# Patient Record
Sex: MALE | Race: WHITE | NOT HISPANIC OR LATINO
[De-identification: names, ages, dates, MRNs, and addresses within clinical notes are randomized per-mention and may not be internally consistent; named-entity substitution may affect disease eponyms.]

---

## 2018-07-11 ENCOUNTER — TRANSCRIPTION ENCOUNTER (OUTPATIENT)
Age: 63
End: 2018-07-11

## 2020-12-14 ENCOUNTER — EMERGENCY (EMERGENCY)
Facility: HOSPITAL | Age: 65
LOS: 1 days | Discharge: HOME | End: 2020-12-14
Attending: STUDENT IN AN ORGANIZED HEALTH CARE EDUCATION/TRAINING PROGRAM | Admitting: STUDENT IN AN ORGANIZED HEALTH CARE EDUCATION/TRAINING PROGRAM
Payer: COMMERCIAL

## 2020-12-14 VITALS
OXYGEN SATURATION: 94 % | SYSTOLIC BLOOD PRESSURE: 111 MMHG | DIASTOLIC BLOOD PRESSURE: 75 MMHG | HEART RATE: 123 BPM | WEIGHT: 169.98 LBS | HEIGHT: 73 IN | RESPIRATION RATE: 24 BRPM | TEMPERATURE: 99 F

## 2020-12-14 VITALS
DIASTOLIC BLOOD PRESSURE: 60 MMHG | SYSTOLIC BLOOD PRESSURE: 128 MMHG | HEART RATE: 88 BPM | RESPIRATION RATE: 16 BRPM | OXYGEN SATURATION: 98 % | TEMPERATURE: 97 F

## 2020-12-14 LAB
ALBUMIN SERPL ELPH-MCNC: 3.3 G/DL — SIGNIFICANT CHANGE UP (ref 3.3–5)
ALP SERPL-CCNC: 67 U/L — SIGNIFICANT CHANGE UP (ref 40–120)
ALT FLD-CCNC: 28 U/L — SIGNIFICANT CHANGE UP (ref 12–78)
ANION GAP SERPL CALC-SCNC: 9 MMOL/L — SIGNIFICANT CHANGE UP (ref 5–17)
AST SERPL-CCNC: 14 U/L — LOW (ref 15–37)
BASOPHILS # BLD AUTO: 0.02 K/UL — SIGNIFICANT CHANGE UP (ref 0–0.2)
BASOPHILS NFR BLD AUTO: 0.2 % — SIGNIFICANT CHANGE UP (ref 0–2)
BILIRUB SERPL-MCNC: 2 MG/DL — HIGH (ref 0.2–1.2)
BUN SERPL-MCNC: 12 MG/DL — SIGNIFICANT CHANGE UP (ref 7–23)
CALCIUM SERPL-MCNC: 8.4 MG/DL — LOW (ref 8.5–10.1)
CHLORIDE SERPL-SCNC: 103 MMOL/L — SIGNIFICANT CHANGE UP (ref 96–108)
CO2 SERPL-SCNC: 26 MMOL/L — SIGNIFICANT CHANGE UP (ref 22–31)
CREAT SERPL-MCNC: 0.91 MG/DL — SIGNIFICANT CHANGE UP (ref 0.5–1.3)
D DIMER BLD IA.RAPID-MCNC: 196 NG/ML DDU — SIGNIFICANT CHANGE UP
EOSINOPHIL # BLD AUTO: 0 K/UL — SIGNIFICANT CHANGE UP (ref 0–0.5)
EOSINOPHIL NFR BLD AUTO: 0 % — SIGNIFICANT CHANGE UP (ref 0–6)
FERRITIN SERPL-MCNC: 191 NG/ML — SIGNIFICANT CHANGE UP (ref 30–400)
GLUCOSE SERPL-MCNC: 109 MG/DL — HIGH (ref 70–99)
HCT VFR BLD CALC: 41.7 % — SIGNIFICANT CHANGE UP (ref 39–50)
HGB BLD-MCNC: 14.5 G/DL — SIGNIFICANT CHANGE UP (ref 13–17)
IMM GRANULOCYTES NFR BLD AUTO: 0.5 % — SIGNIFICANT CHANGE UP (ref 0–1.5)
LACTATE SERPL-SCNC: 1.6 MMOL/L — SIGNIFICANT CHANGE UP (ref 0.7–2)
LYMPHOCYTES # BLD AUTO: 0.68 K/UL — LOW (ref 1–3.3)
LYMPHOCYTES # BLD AUTO: 6.4 % — LOW (ref 13–44)
MCHC RBC-ENTMCNC: 29.2 PG — SIGNIFICANT CHANGE UP (ref 27–34)
MCHC RBC-ENTMCNC: 34.8 GM/DL — SIGNIFICANT CHANGE UP (ref 32–36)
MCV RBC AUTO: 84.1 FL — SIGNIFICANT CHANGE UP (ref 80–100)
MONOCYTES # BLD AUTO: 0.98 K/UL — HIGH (ref 0–0.9)
MONOCYTES NFR BLD AUTO: 9.3 % — SIGNIFICANT CHANGE UP (ref 2–14)
NEUTROPHILS # BLD AUTO: 8.82 K/UL — HIGH (ref 1.8–7.4)
NEUTROPHILS NFR BLD AUTO: 83.6 % — HIGH (ref 43–77)
NRBC # BLD: 0 /100 WBCS — SIGNIFICANT CHANGE UP (ref 0–0)
NT-PROBNP SERPL-SCNC: 4512 PG/ML — HIGH (ref 0–125)
PLATELET # BLD AUTO: 178 K/UL — SIGNIFICANT CHANGE UP (ref 150–400)
POTASSIUM SERPL-MCNC: 3.4 MMOL/L — LOW (ref 3.5–5.3)
POTASSIUM SERPL-SCNC: 3.4 MMOL/L — LOW (ref 3.5–5.3)
PROCALCITONIN SERPL-MCNC: <0.05 — SIGNIFICANT CHANGE UP (ref 0–0.04)
PROT SERPL-MCNC: 6.6 G/DL — SIGNIFICANT CHANGE UP (ref 6–8.3)
RAPID RVP RESULT: SIGNIFICANT CHANGE UP
RBC # BLD: 4.96 M/UL — SIGNIFICANT CHANGE UP (ref 4.2–5.8)
RBC # FLD: 13.1 % — SIGNIFICANT CHANGE UP (ref 10.3–14.5)
SARS-COV-2 RNA SPEC QL NAA+PROBE: SIGNIFICANT CHANGE UP
SODIUM SERPL-SCNC: 138 MMOL/L — SIGNIFICANT CHANGE UP (ref 135–145)
TROPONIN I SERPL-MCNC: <.015 NG/ML — SIGNIFICANT CHANGE UP (ref 0.01–0.04)
WBC # BLD: 10.55 K/UL — HIGH (ref 3.8–10.5)
WBC # FLD AUTO: 10.55 K/UL — HIGH (ref 3.8–10.5)

## 2020-12-14 PROCEDURE — 93005 ELECTROCARDIOGRAM TRACING: CPT

## 2020-12-14 PROCEDURE — 96365 THER/PROPH/DIAG IV INF INIT: CPT

## 2020-12-14 PROCEDURE — 80053 COMPREHEN METABOLIC PANEL: CPT

## 2020-12-14 PROCEDURE — 71045 X-RAY EXAM CHEST 1 VIEW: CPT

## 2020-12-14 PROCEDURE — 83605 ASSAY OF LACTIC ACID: CPT

## 2020-12-14 PROCEDURE — 87040 BLOOD CULTURE FOR BACTERIA: CPT

## 2020-12-14 PROCEDURE — 93010 ELECTROCARDIOGRAM REPORT: CPT

## 2020-12-14 PROCEDURE — 82728 ASSAY OF FERRITIN: CPT

## 2020-12-14 PROCEDURE — 85379 FIBRIN DEGRADATION QUANT: CPT

## 2020-12-14 PROCEDURE — 99285 EMERGENCY DEPT VISIT HI MDM: CPT

## 2020-12-14 PROCEDURE — 83880 ASSAY OF NATRIURETIC PEPTIDE: CPT

## 2020-12-14 PROCEDURE — 85025 COMPLETE CBC W/AUTO DIFF WBC: CPT

## 2020-12-14 PROCEDURE — 99284 EMERGENCY DEPT VISIT MOD MDM: CPT | Mod: 25

## 2020-12-14 PROCEDURE — 71045 X-RAY EXAM CHEST 1 VIEW: CPT | Mod: 26

## 2020-12-14 PROCEDURE — 84484 ASSAY OF TROPONIN QUANT: CPT

## 2020-12-14 PROCEDURE — 84145 PROCALCITONIN (PCT): CPT

## 2020-12-14 PROCEDURE — 36415 COLL VENOUS BLD VENIPUNCTURE: CPT

## 2020-12-14 PROCEDURE — 0225U NFCT DS DNA&RNA 21 SARSCOV2: CPT

## 2020-12-14 RX ORDER — POTASSIUM CHLORIDE 20 MEQ
40 PACKET (EA) ORAL ONCE
Refills: 0 | Status: COMPLETED | OUTPATIENT
Start: 2020-12-14 | End: 2020-12-14

## 2020-12-14 RX ORDER — CEFTRIAXONE 500 MG/1
1000 INJECTION, POWDER, FOR SOLUTION INTRAMUSCULAR; INTRAVENOUS ONCE
Refills: 0 | Status: COMPLETED | OUTPATIENT
Start: 2020-12-14 | End: 2020-12-14

## 2020-12-14 RX ORDER — ACETAMINOPHEN 500 MG
1000 TABLET ORAL ONCE
Refills: 0 | Status: COMPLETED | OUTPATIENT
Start: 2020-12-14 | End: 2020-12-14

## 2020-12-14 RX ORDER — SODIUM CHLORIDE 9 MG/ML
1500 INJECTION INTRAMUSCULAR; INTRAVENOUS; SUBCUTANEOUS ONCE
Refills: 0 | Status: COMPLETED | OUTPATIENT
Start: 2020-12-14 | End: 2020-12-14

## 2020-12-14 RX ORDER — AZITHROMYCIN 500 MG/1
500 TABLET, FILM COATED ORAL ONCE
Refills: 0 | Status: COMPLETED | OUTPATIENT
Start: 2020-12-14 | End: 2020-12-14

## 2020-12-14 RX ADMIN — SODIUM CHLORIDE 1500 MILLILITER(S): 9 INJECTION INTRAMUSCULAR; INTRAVENOUS; SUBCUTANEOUS at 09:30

## 2020-12-14 RX ADMIN — CEFTRIAXONE 100 MILLIGRAM(S): 500 INJECTION, POWDER, FOR SOLUTION INTRAMUSCULAR; INTRAVENOUS at 10:50

## 2020-12-14 RX ADMIN — CEFTRIAXONE 1000 MILLIGRAM(S): 500 INJECTION, POWDER, FOR SOLUTION INTRAMUSCULAR; INTRAVENOUS at 11:20

## 2020-12-14 RX ADMIN — Medication 1000 MILLIGRAM(S): at 09:59

## 2020-12-14 RX ADMIN — Medication 40 MILLIEQUIVALENT(S): at 10:51

## 2020-12-14 RX ADMIN — Medication 1000 MILLIGRAM(S): at 10:52

## 2020-12-14 RX ADMIN — AZITHROMYCIN 500 MILLIGRAM(S): 500 TABLET, FILM COATED ORAL at 12:00

## 2020-12-14 NOTE — ED PROVIDER NOTE - PATIENT PORTAL LINK FT
You can access the FollowMyHealth Patient Portal offered by [facility] by registering at the following website: [email or web address]. By joining [facility]’s FollowMyHealth portal, you will also be able to view your health information using other applications (apps) compatible with our system.

## 2020-12-14 NOTE — ED ADULT NURSE NOTE - OBJECTIVE STATEMENT
Received the patient in the Er. Patient is alert and oriented. Skin warm and dry. C/o weakness and not feeling fell.

## 2020-12-14 NOTE — ED PROVIDER NOTE - NS ED ROS FT
Constitutional: No fever.  Neurological: No headache.  Eyes: No vision changes.   Ears, Nose, Mouth, Throat: No congestion.  Cardiovascular: +chest pain.  Respiratory: + difficulty breathing.  Gastrointestinal: +nausea. No vomiting.  Genitourinary: No dysuria.  Musculoskeletal: + musculoskeletal pain.  Integumentary (skin and/or breast): No rash.

## 2020-12-14 NOTE — ED PROVIDER NOTE - PROGRESS NOTE DETAILS
patient feels well, found to have PNA, will discharge with PMD follow up. patient made aware of eelvated BNP, will follow up with cardio.

## 2020-12-14 NOTE — ED PROVIDER NOTE - CARE PROVIDER_API CALL
Moon Torres  CRITICAL CARE MEDICINE  [address] [place] [address], [address]  [place] [address]  Phone: [phone]  Fax: [phone]  Established Patient  Follow Up Time: 1-3 Days

## 2020-12-14 NOTE — ED PROVIDER NOTE - OBJECTIVE STATEMENT
65 M history of Myasthenia gravis, HTN, left foot drop here complaining of several days of body aches, chest pressure, cough, shortness of breath. Was tested for COVID-19 rapid which was negative, he is waiting for the PCR. he takes prednisone for MG but does not feel like he is having an exacerbation.

## 2020-12-14 NOTE — ED PROVIDER NOTE - CLINICAL SUMMARY MEDICAL DECISION MAKING FREE TEXT BOX
shortness of breath, chest pain, cough- r/o PNA, ACS, PE. May represent viral syndrome inclusive of COVID-19

## 2020-12-14 NOTE — ED PROVIDER NOTE - PHYSICAL EXAMINATION
Vital signs as available reviewed.  General:  Comfortable, no acute distress.  Head:  Normocephalic, atraumatic.  Eyes:  Conjunctiva pink, no icterus.  Cardiovascular:  Tachycardia, + systolic murmur.  Respiratory:  Clear to auscultation, good air entry bilaterally.  Abdomen:  Soft, non-tender.  Musculoskeletal:  No deformity or calf tenderness.  Neurologic: Alert and oriented, moving all extremities.  Skin:  Warm and dry.

## 2020-12-18 ENCOUNTER — TRANSCRIPTION ENCOUNTER (OUTPATIENT)
Age: 65
End: 2020-12-18

## 2020-12-18 ENCOUNTER — INPATIENT (INPATIENT)
Facility: HOSPITAL | Age: 65
LOS: 1 days | Discharge: OTHER INSTITUTION | DRG: 177 | End: 2020-12-20
Attending: INTERNAL MEDICINE | Admitting: INTERNAL MEDICINE
Payer: COMMERCIAL

## 2020-12-18 VITALS
OXYGEN SATURATION: 89 % | SYSTOLIC BLOOD PRESSURE: 103 MMHG | TEMPERATURE: 99 F | HEIGHT: 73 IN | DIASTOLIC BLOOD PRESSURE: 77 MMHG | WEIGHT: 169.98 LBS | HEART RATE: 140 BPM | RESPIRATION RATE: 32 BRPM

## 2020-12-18 DIAGNOSIS — J96.01 ACUTE RESPIRATORY FAILURE WITH HYPOXIA: ICD-10-CM

## 2020-12-18 DIAGNOSIS — J18.9 PNEUMONIA, UNSPECIFIED ORGANISM: ICD-10-CM

## 2020-12-18 DIAGNOSIS — G70.00 MYASTHENIA GRAVIS WITHOUT (ACUTE) EXACERBATION: ICD-10-CM

## 2020-12-18 DIAGNOSIS — Z29.9 ENCOUNTER FOR PROPHYLACTIC MEASURES, UNSPECIFIED: ICD-10-CM

## 2020-12-18 DIAGNOSIS — I10 ESSENTIAL (PRIMARY) HYPERTENSION: ICD-10-CM

## 2020-12-18 DIAGNOSIS — E78.5 HYPERLIPIDEMIA, UNSPECIFIED: ICD-10-CM

## 2020-12-18 DIAGNOSIS — Z98.890 OTHER SPECIFIED POSTPROCEDURAL STATES: Chronic | ICD-10-CM

## 2020-12-18 DIAGNOSIS — I48.91 UNSPECIFIED ATRIAL FIBRILLATION: ICD-10-CM

## 2020-12-18 PROBLEM — R01.1 CARDIAC MURMUR, UNSPECIFIED: Chronic | Status: ACTIVE | Noted: 2020-12-14

## 2020-12-18 LAB
ALBUMIN SERPL ELPH-MCNC: 2.8 G/DL — LOW (ref 3.3–5)
ALBUMIN SERPL ELPH-MCNC: 3 G/DL — LOW (ref 3.3–5)
ALP SERPL-CCNC: 50 U/L — SIGNIFICANT CHANGE UP (ref 40–120)
ALP SERPL-CCNC: 54 U/L — SIGNIFICANT CHANGE UP (ref 40–120)
ALT FLD-CCNC: 28 U/L — SIGNIFICANT CHANGE UP (ref 12–78)
ALT FLD-CCNC: 29 U/L — SIGNIFICANT CHANGE UP (ref 12–78)
ANION GAP SERPL CALC-SCNC: 11 MMOL/L — SIGNIFICANT CHANGE UP (ref 5–17)
ANION GAP SERPL CALC-SCNC: 12 MMOL/L — SIGNIFICANT CHANGE UP (ref 5–17)
APTT BLD: 24.7 SEC — LOW (ref 27.5–35.5)
AST SERPL-CCNC: 15 U/L — SIGNIFICANT CHANGE UP (ref 15–37)
AST SERPL-CCNC: 15 U/L — SIGNIFICANT CHANGE UP (ref 15–37)
BASE EXCESS BLDV CALC-SCNC: -0.4 MMOL/L — SIGNIFICANT CHANGE UP (ref -2–2)
BASOPHILS # BLD AUTO: 0.02 K/UL — SIGNIFICANT CHANGE UP (ref 0–0.2)
BASOPHILS # BLD AUTO: 0.03 K/UL — SIGNIFICANT CHANGE UP (ref 0–0.2)
BASOPHILS NFR BLD AUTO: 0.2 % — SIGNIFICANT CHANGE UP (ref 0–2)
BASOPHILS NFR BLD AUTO: 0.3 % — SIGNIFICANT CHANGE UP (ref 0–2)
BILIRUB SERPL-MCNC: 0.9 MG/DL — SIGNIFICANT CHANGE UP (ref 0.2–1.2)
BILIRUB SERPL-MCNC: 1.1 MG/DL — SIGNIFICANT CHANGE UP (ref 0.2–1.2)
BLOOD GAS COMMENTS, VENOUS: SIGNIFICANT CHANGE UP
BUN SERPL-MCNC: 11 MG/DL — SIGNIFICANT CHANGE UP (ref 7–23)
BUN SERPL-MCNC: 12 MG/DL — SIGNIFICANT CHANGE UP (ref 7–23)
CALCIUM SERPL-MCNC: 8.3 MG/DL — LOW (ref 8.5–10.1)
CALCIUM SERPL-MCNC: 8.5 MG/DL — SIGNIFICANT CHANGE UP (ref 8.5–10.1)
CHLORIDE SERPL-SCNC: 100 MMOL/L — SIGNIFICANT CHANGE UP (ref 96–108)
CHLORIDE SERPL-SCNC: 99 MMOL/L — SIGNIFICANT CHANGE UP (ref 96–108)
CO2 SERPL-SCNC: 24 MMOL/L — SIGNIFICANT CHANGE UP (ref 22–31)
CO2 SERPL-SCNC: 26 MMOL/L — SIGNIFICANT CHANGE UP (ref 22–31)
CREAT SERPL-MCNC: 0.8 MG/DL — SIGNIFICANT CHANGE UP (ref 0.5–1.3)
CREAT SERPL-MCNC: 0.81 MG/DL — SIGNIFICANT CHANGE UP (ref 0.5–1.3)
CRP SERPL-MCNC: 10.27 MG/DL — HIGH (ref 0–0.4)
CRP SERPL-MCNC: 9.38 MG/DL — HIGH (ref 0–0.4)
D DIMER BLD IA.RAPID-MCNC: 286 NG/ML DDU — HIGH
D DIMER BLD IA.RAPID-MCNC: 347 NG/ML DDU — HIGH
EOSINOPHIL # BLD AUTO: 0.02 K/UL — SIGNIFICANT CHANGE UP (ref 0–0.5)
EOSINOPHIL # BLD AUTO: 0.16 K/UL — SIGNIFICANT CHANGE UP (ref 0–0.5)
EOSINOPHIL NFR BLD AUTO: 0.2 % — SIGNIFICANT CHANGE UP (ref 0–6)
EOSINOPHIL NFR BLD AUTO: 1.8 % — SIGNIFICANT CHANGE UP (ref 0–6)
FERRITIN SERPL-MCNC: 228 NG/ML — SIGNIFICANT CHANGE UP (ref 30–400)
FERRITIN SERPL-MCNC: 229 NG/ML — SIGNIFICANT CHANGE UP (ref 30–400)
GLUCOSE SERPL-MCNC: 112 MG/DL — HIGH (ref 70–99)
GLUCOSE SERPL-MCNC: 121 MG/DL — HIGH (ref 70–99)
HCO3 BLDV-SCNC: 22 MMOL/L — SIGNIFICANT CHANGE UP (ref 21–29)
HCT VFR BLD CALC: 37.4 % — LOW (ref 39–50)
HCT VFR BLD CALC: 38.2 % — LOW (ref 39–50)
HGB BLD-MCNC: 13.1 G/DL — SIGNIFICANT CHANGE UP (ref 13–17)
HGB BLD-MCNC: 13.3 G/DL — SIGNIFICANT CHANGE UP (ref 13–17)
HOROWITZ INDEX BLDV+IHG-RTO: 21 — SIGNIFICANT CHANGE UP
IMM GRANULOCYTES NFR BLD AUTO: 0.3 % — SIGNIFICANT CHANGE UP (ref 0–1.5)
IMM GRANULOCYTES NFR BLD AUTO: 0.7 % — SIGNIFICANT CHANGE UP (ref 0–1.5)
INR BLD: 1.23 RATIO — HIGH (ref 0.88–1.16)
LACTATE SERPL-SCNC: 1.8 MMOL/L — SIGNIFICANT CHANGE UP (ref 0.7–2)
LYMPHOCYTES # BLD AUTO: 0.64 K/UL — LOW (ref 1–3.3)
LYMPHOCYTES # BLD AUTO: 0.89 K/UL — LOW (ref 1–3.3)
LYMPHOCYTES # BLD AUTO: 6.7 % — LOW (ref 13–44)
LYMPHOCYTES # BLD AUTO: 9.9 % — LOW (ref 13–44)
MCHC RBC-ENTMCNC: 29.4 PG — SIGNIFICANT CHANGE UP (ref 27–34)
MCHC RBC-ENTMCNC: 29.6 PG — SIGNIFICANT CHANGE UP (ref 27–34)
MCHC RBC-ENTMCNC: 34.8 GM/DL — SIGNIFICANT CHANGE UP (ref 32–36)
MCHC RBC-ENTMCNC: 35 GM/DL — SIGNIFICANT CHANGE UP (ref 32–36)
MCV RBC AUTO: 84 FL — SIGNIFICANT CHANGE UP (ref 80–100)
MCV RBC AUTO: 85.1 FL — SIGNIFICANT CHANGE UP (ref 80–100)
MONOCYTES # BLD AUTO: 0.99 K/UL — HIGH (ref 0–0.9)
MONOCYTES # BLD AUTO: 1.04 K/UL — HIGH (ref 0–0.9)
MONOCYTES NFR BLD AUTO: 10.4 % — SIGNIFICANT CHANGE UP (ref 2–14)
MONOCYTES NFR BLD AUTO: 11.6 % — SIGNIFICANT CHANGE UP (ref 2–14)
NEUTROPHILS # BLD AUTO: 6.79 K/UL — SIGNIFICANT CHANGE UP (ref 1.8–7.4)
NEUTROPHILS # BLD AUTO: 7.84 K/UL — HIGH (ref 1.8–7.4)
NEUTROPHILS NFR BLD AUTO: 75.7 % — SIGNIFICANT CHANGE UP (ref 43–77)
NEUTROPHILS NFR BLD AUTO: 82.2 % — HIGH (ref 43–77)
NRBC # BLD: 0 /100 WBCS — SIGNIFICANT CHANGE UP (ref 0–0)
NRBC # BLD: 0 /100 WBCS — SIGNIFICANT CHANGE UP (ref 0–0)
PCO2 BLDV: 50 MMHG — SIGNIFICANT CHANGE UP (ref 35–50)
PH BLDV: 7.32 — LOW (ref 7.35–7.45)
PLATELET # BLD AUTO: 194 K/UL — SIGNIFICANT CHANGE UP (ref 150–400)
PLATELET # BLD AUTO: 266 K/UL — SIGNIFICANT CHANGE UP (ref 150–400)
PO2 BLDV: <44 MMHG — SIGNIFICANT CHANGE UP (ref 25–45)
POTASSIUM SERPL-MCNC: 3.7 MMOL/L — SIGNIFICANT CHANGE UP (ref 3.5–5.3)
POTASSIUM SERPL-MCNC: 3.8 MMOL/L — SIGNIFICANT CHANGE UP (ref 3.5–5.3)
POTASSIUM SERPL-SCNC: 3.7 MMOL/L — SIGNIFICANT CHANGE UP (ref 3.5–5.3)
POTASSIUM SERPL-SCNC: 3.8 MMOL/L — SIGNIFICANT CHANGE UP (ref 3.5–5.3)
PROT SERPL-MCNC: 6.1 G/DL — SIGNIFICANT CHANGE UP (ref 6–8.3)
PROT SERPL-MCNC: 6.5 G/DL — SIGNIFICANT CHANGE UP (ref 6–8.3)
PROTHROM AB SERPL-ACNC: 14.2 SEC — HIGH (ref 10.6–13.6)
RBC # BLD: 4.45 M/UL — SIGNIFICANT CHANGE UP (ref 4.2–5.8)
RBC # BLD: 4.49 M/UL — SIGNIFICANT CHANGE UP (ref 4.2–5.8)
RBC # FLD: 13 % — SIGNIFICANT CHANGE UP (ref 10.3–14.5)
RBC # FLD: 13.1 % — SIGNIFICANT CHANGE UP (ref 10.3–14.5)
SAO2 % BLDV: 42 % — LOW (ref 67–88)
SARS-COV-2 RNA SPEC QL NAA+PROBE: SIGNIFICANT CHANGE UP
SODIUM SERPL-SCNC: 136 MMOL/L — SIGNIFICANT CHANGE UP (ref 135–145)
SODIUM SERPL-SCNC: 136 MMOL/L — SIGNIFICANT CHANGE UP (ref 135–145)
WBC # BLD: 8.97 K/UL — SIGNIFICANT CHANGE UP (ref 3.8–10.5)
WBC # BLD: 9.54 K/UL — SIGNIFICANT CHANGE UP (ref 3.8–10.5)
WBC # FLD AUTO: 8.97 K/UL — SIGNIFICANT CHANGE UP (ref 3.8–10.5)
WBC # FLD AUTO: 9.54 K/UL — SIGNIFICANT CHANGE UP (ref 3.8–10.5)

## 2020-12-18 PROCEDURE — 71045 X-RAY EXAM CHEST 1 VIEW: CPT | Mod: 26

## 2020-12-18 PROCEDURE — 99285 EMERGENCY DEPT VISIT HI MDM: CPT

## 2020-12-18 PROCEDURE — 99223 1ST HOSP IP/OBS HIGH 75: CPT

## 2020-12-18 PROCEDURE — 71275 CT ANGIOGRAPHY CHEST: CPT | Mod: 26

## 2020-12-18 RX ORDER — PIPERACILLIN AND TAZOBACTAM 4; .5 G/20ML; G/20ML
0 INJECTION, POWDER, LYOPHILIZED, FOR SOLUTION INTRAVENOUS
Qty: 0 | Refills: 0 | DISCHARGE
Start: 2020-12-18

## 2020-12-18 RX ORDER — DILTIAZEM HCL 120 MG
15 CAPSULE, EXT RELEASE 24 HR ORAL
Qty: 125 | Refills: 0 | Status: DISCONTINUED | OUTPATIENT
Start: 2020-12-18 | End: 2020-12-20

## 2020-12-18 RX ORDER — ATORVASTATIN CALCIUM 80 MG/1
1 TABLET, FILM COATED ORAL
Qty: 0 | Refills: 0 | DISCHARGE
Start: 2020-12-18

## 2020-12-18 RX ORDER — ATORVASTATIN CALCIUM 80 MG/1
10 TABLET, FILM COATED ORAL AT BEDTIME
Refills: 0 | Status: DISCONTINUED | OUTPATIENT
Start: 2020-12-18 | End: 2020-12-18

## 2020-12-18 RX ORDER — TRIAMTERENE/HYDROCHLOROTHIAZID 75 MG-50MG
1 TABLET ORAL
Qty: 0 | Refills: 0 | DISCHARGE

## 2020-12-18 RX ORDER — AMLODIPINE BESYLATE 2.5 MG/1
1 TABLET ORAL
Qty: 0 | Refills: 0 | DISCHARGE

## 2020-12-18 RX ORDER — ASPIRIN/CALCIUM CARB/MAGNESIUM 324 MG
1 TABLET ORAL
Qty: 0 | Refills: 0 | DISCHARGE

## 2020-12-18 RX ORDER — PIPERACILLIN AND TAZOBACTAM 4; .5 G/20ML; G/20ML
3.38 INJECTION, POWDER, LYOPHILIZED, FOR SOLUTION INTRAVENOUS ONCE
Refills: 0 | Status: COMPLETED | OUTPATIENT
Start: 2020-12-18 | End: 2020-12-18

## 2020-12-18 RX ORDER — DILTIAZEM HCL 120 MG
10 CAPSULE, EXT RELEASE 24 HR ORAL ONCE
Refills: 0 | Status: COMPLETED | OUTPATIENT
Start: 2020-12-18 | End: 2020-12-18

## 2020-12-18 RX ORDER — METOPROLOL TARTRATE 50 MG
25 TABLET ORAL EVERY 6 HOURS
Refills: 0 | Status: DISCONTINUED | OUTPATIENT
Start: 2020-12-18 | End: 2020-12-18

## 2020-12-18 RX ORDER — CHLORHEXIDINE GLUCONATE 213 G/1000ML
1 SOLUTION TOPICAL
Refills: 0 | Status: DISCONTINUED | OUTPATIENT
Start: 2020-12-18 | End: 2020-12-20

## 2020-12-18 RX ORDER — METOPROLOL TARTRATE 50 MG
5 TABLET ORAL ONCE
Refills: 0 | Status: DISCONTINUED | OUTPATIENT
Start: 2020-12-18 | End: 2020-12-18

## 2020-12-18 RX ORDER — DILTIAZEM HCL 120 MG
15 CAPSULE, EXT RELEASE 24 HR ORAL
Qty: 125 | Refills: 0 | Status: DISCONTINUED | OUTPATIENT
Start: 2020-12-18 | End: 2020-12-18

## 2020-12-18 RX ORDER — METOPROLOL TARTRATE 50 MG
1 TABLET ORAL
Qty: 0 | Refills: 0 | DISCHARGE

## 2020-12-18 RX ORDER — METOPROLOL TARTRATE 50 MG
25 TABLET ORAL EVERY 6 HOURS
Refills: 0 | Status: DISCONTINUED | OUTPATIENT
Start: 2020-12-18 | End: 2020-12-20

## 2020-12-18 RX ORDER — DIGOXIN 250 MCG
0.25 TABLET ORAL EVERY 6 HOURS
Refills: 0 | Status: DISCONTINUED | OUTPATIENT
Start: 2020-12-18 | End: 2020-12-18

## 2020-12-18 RX ORDER — SODIUM CHLORIDE 9 MG/ML
1000 INJECTION INTRAMUSCULAR; INTRAVENOUS; SUBCUTANEOUS ONCE
Refills: 0 | Status: COMPLETED | OUTPATIENT
Start: 2020-12-18 | End: 2020-12-18

## 2020-12-18 RX ORDER — DILTIAZEM HCL 120 MG
5 CAPSULE, EXT RELEASE 24 HR ORAL
Qty: 125 | Refills: 0 | Status: DISCONTINUED | OUTPATIENT
Start: 2020-12-18 | End: 2020-12-18

## 2020-12-18 RX ORDER — ATORVASTATIN CALCIUM 80 MG/1
40 TABLET, FILM COATED ORAL AT BEDTIME
Refills: 0 | Status: DISCONTINUED | OUTPATIENT
Start: 2020-12-18 | End: 2020-12-20

## 2020-12-18 RX ORDER — PIPERACILLIN AND TAZOBACTAM 4; .5 G/20ML; G/20ML
3.38 INJECTION, POWDER, LYOPHILIZED, FOR SOLUTION INTRAVENOUS ONCE
Refills: 0 | Status: DISCONTINUED | OUTPATIENT
Start: 2020-12-18 | End: 2020-12-18

## 2020-12-18 RX ORDER — METOPROLOL TARTRATE 50 MG
5 TABLET ORAL ONCE
Refills: 0 | Status: COMPLETED | OUTPATIENT
Start: 2020-12-18 | End: 2020-12-18

## 2020-12-18 RX ORDER — FUROSEMIDE 40 MG
40 TABLET ORAL ONCE
Refills: 0 | Status: COMPLETED | OUTPATIENT
Start: 2020-12-18 | End: 2020-12-18

## 2020-12-18 RX ORDER — PIPERACILLIN AND TAZOBACTAM 4; .5 G/20ML; G/20ML
3.38 INJECTION, POWDER, LYOPHILIZED, FOR SOLUTION INTRAVENOUS EVERY 8 HOURS
Refills: 0 | Status: DISCONTINUED | OUTPATIENT
Start: 2020-12-18 | End: 2020-12-20

## 2020-12-18 RX ORDER — FUROSEMIDE 40 MG
40 TABLET ORAL
Refills: 0 | Status: DISCONTINUED | OUTPATIENT
Start: 2020-12-18 | End: 2020-12-20

## 2020-12-18 RX ORDER — DILTIAZEM HCL 120 MG
10 CAPSULE, EXT RELEASE 24 HR ORAL
Qty: 125 | Refills: 0 | Status: DISCONTINUED | OUTPATIENT
Start: 2020-12-18 | End: 2020-12-18

## 2020-12-18 RX ORDER — FUROSEMIDE 40 MG
40 TABLET ORAL
Refills: 0 | Status: DISCONTINUED | OUTPATIENT
Start: 2020-12-18 | End: 2020-12-18

## 2020-12-18 RX ORDER — ENOXAPARIN SODIUM 100 MG/ML
80 INJECTION SUBCUTANEOUS ONCE
Refills: 0 | Status: COMPLETED | OUTPATIENT
Start: 2020-12-18 | End: 2020-12-18

## 2020-12-18 RX ORDER — ATORVASTATIN CALCIUM 80 MG/1
1 TABLET, FILM COATED ORAL
Qty: 0 | Refills: 0 | DISCHARGE

## 2020-12-18 RX ORDER — ENOXAPARIN SODIUM 100 MG/ML
80 INJECTION SUBCUTANEOUS
Refills: 0 | Status: COMPLETED | OUTPATIENT
Start: 2020-12-18 | End: 2020-12-19

## 2020-12-18 RX ORDER — DILTIAZEM HCL 120 MG
0 CAPSULE, EXT RELEASE 24 HR ORAL
Qty: 0 | Refills: 0 | DISCHARGE
Start: 2020-12-18

## 2020-12-18 RX ORDER — DIGOXIN 250 MCG
0.5 TABLET ORAL ONCE
Refills: 0 | Status: DISCONTINUED | OUTPATIENT
Start: 2020-12-18 | End: 2020-12-18

## 2020-12-18 RX ORDER — VANCOMYCIN HCL 1 G
1000 VIAL (EA) INTRAVENOUS ONCE
Refills: 0 | Status: COMPLETED | OUTPATIENT
Start: 2020-12-18 | End: 2020-12-18

## 2020-12-18 RX ADMIN — ENOXAPARIN SODIUM 80 MILLIGRAM(S): 100 INJECTION SUBCUTANEOUS at 18:24

## 2020-12-18 RX ADMIN — Medication 10 MILLIGRAM(S): at 03:02

## 2020-12-18 RX ADMIN — Medication 250 MILLIGRAM(S): at 02:13

## 2020-12-18 RX ADMIN — ENOXAPARIN SODIUM 80 MILLIGRAM(S): 100 INJECTION SUBCUTANEOUS at 05:33

## 2020-12-18 RX ADMIN — Medication 10 MILLIGRAM(S): at 04:46

## 2020-12-18 RX ADMIN — PIPERACILLIN AND TAZOBACTAM 25 GRAM(S): 4; .5 INJECTION, POWDER, LYOPHILIZED, FOR SOLUTION INTRAVENOUS at 07:35

## 2020-12-18 RX ADMIN — PIPERACILLIN AND TAZOBACTAM 25 GRAM(S): 4; .5 INJECTION, POWDER, LYOPHILIZED, FOR SOLUTION INTRAVENOUS at 15:26

## 2020-12-18 RX ADMIN — Medication 15 MG/HR: at 07:55

## 2020-12-18 RX ADMIN — Medication 25 MILLIGRAM(S): at 18:24

## 2020-12-18 RX ADMIN — Medication 25 MILLIGRAM(S): at 15:26

## 2020-12-18 RX ADMIN — PIPERACILLIN AND TAZOBACTAM 3.38 GRAM(S): 4; .5 INJECTION, POWDER, LYOPHILIZED, FOR SOLUTION INTRAVENOUS at 02:04

## 2020-12-18 RX ADMIN — Medication 40 MILLIGRAM(S): at 18:24

## 2020-12-18 RX ADMIN — Medication 5 MG/HR: at 05:33

## 2020-12-18 RX ADMIN — Medication 5 MILLIGRAM(S): at 11:57

## 2020-12-18 RX ADMIN — SODIUM CHLORIDE 1000 MILLILITER(S): 9 INJECTION INTRAMUSCULAR; INTRAVENOUS; SUBCUTANEOUS at 01:34

## 2020-12-18 RX ADMIN — PIPERACILLIN AND TAZOBACTAM 200 GRAM(S): 4; .5 INJECTION, POWDER, LYOPHILIZED, FOR SOLUTION INTRAVENOUS at 01:34

## 2020-12-18 RX ADMIN — PIPERACILLIN AND TAZOBACTAM 25 GRAM(S): 4; .5 INJECTION, POWDER, LYOPHILIZED, FOR SOLUTION INTRAVENOUS at 22:53

## 2020-12-18 RX ADMIN — Medication 15 MG/HR: at 13:13

## 2020-12-18 RX ADMIN — Medication 40 MILLIGRAM(S): at 15:13

## 2020-12-18 RX ADMIN — Medication 10 MG/HR: at 06:20

## 2020-12-18 RX ADMIN — Medication 1000 MILLIGRAM(S): at 03:13

## 2020-12-18 RX ADMIN — Medication 10 MILLIGRAM(S): at 01:34

## 2020-12-18 RX ADMIN — SODIUM CHLORIDE 1000 MILLILITER(S): 9 INJECTION INTRAMUSCULAR; INTRAVENOUS; SUBCUTANEOUS at 02:34

## 2020-12-18 NOTE — PROGRESS NOTE ADULT - PROBLEM SELECTOR PLAN 2
no hx of afib. EKG here revealed Afib w/ RVR at 147 bpm  - Hemodynamically stable. /80  - Hr now 139 s/p Cardizem 10mg IVP x3 in ED  - started on cardizem gtt at 5mg/hr will titrate to 10mg/hr if needed.  - continue routine hemodynamic monitoring  - tele monitoring  - ChadsVasc score 2, moderate-high risk. Will give one time full dose lovenox. Further anticoagulation pending cardio recs.  - Cardio [facility] consulted. f/u recs no hx of afib. EKG here revealed Afib w/ RVR at 147 bpm  - Hemodynamically stable. /80  - Hr now 139 s/p Cardizem 10mg IVP x3 in ED  - started on cardizem gtt at 5mg/hr will titrated to 20mg/hr  - continue routine hemodynamic monitoring  - tele monitoring  - ChadsVasc score 2, moderate-high risk. Will give one time full dose lovenox. Further anticoagulation pending cardio recs.  - [facility]io [facility] consulted. f/u recs no hx of afib. EKG here revealed Afib w/ RVR at 147 bpm  - Hemodynamically stable. /80  - Hr now 139 s/p Cardizem 10mg IVP x3 in ED  - started on cardizem gtt at 5mg/hr will titrated to 20mg/hr  s/p lopressor 5mg IV x1  - continue routine hemodynamic monitoring  - tele monitoring  - ChadsVasc score 2, moderate-high risk.   -Full dose anticoagulation lovenox 80 mg BID  - Cardio [facility] consulted. f/u recs no hx of afib. EKG here revealed Afib w/ RVR at 147 bpm, likely 2/2 CAP vs acute decompensated heart failure.  - Hemodynamically stable. /80  - Hr now 139 s/p Cardizem 10mg IVP x3 in ED  - started on cardizem gtt at 5mg/hr will titrated to 20mg/hr  s/p lopressor 5mg IV x1  - continue routine hemodynamic monitoring  - tele monitoring  - ChadsVasc score 2, moderate-high risk.   -Full dose anticoagulation lovenox 80 mg BID  - Cardio [facility] consulted. recs appreciated  - start lopressor 25 mg PO BID while inpatient.

## 2020-12-18 NOTE — SWALLOW BEDSIDE ASSESSMENT ADULT - SWALLOW EVAL: DIAGNOSIS
1. The patient demonstrated functional oral management of puree, mechanical soft, regular solids, honey thick liquid and thin liquid textures marked by adequate bolus collection, transfer and posterior transport. 2. Pharyngeal skills for all consistencies trialed characterized by suspected timely initiation of swallow trigger with + hyolaryngeal elevation upon digital palpation without evidence of airway penetration. 3. Recommend regular solids/thin liquids + aspiration precautions and a Modified Barium Swallow Study to rule out silent aspiration.

## 2020-12-18 NOTE — DISCHARGE NOTE PROVIDER - PROVIDER TOKENS
PROVIDER:[TOKEN:[[id number]],FOLLOWUP:[1 week],ESTABLISHEDPATIENT:[T]] PROVIDER:[TOKEN:[[id number]],FOLLOWUP:[1 week],ESTABLISHEDPATIENT:[T]],PROVIDER:[[id number]],FOLLOWUP:[1 week],ESTABLISHEDPATIENT:[T]]

## 2020-12-18 NOTE — H&P ADULT - PROBLEM SELECTOR PLAN 5
DVT prophylaxis:     IMPROVE VTE Individual Risk Assessment  RISK                                                                Points  [  ] Previous VTE                                                  3  [  ] Thrombophilia                                               2  [  ] Lower limb paralysis                                      2        (unable to hold up >15 seconds)    [  ] Current Cancer                                              2         (within 6 months)  [  ] Immobilization > 24 hrs                                1  [  ] ICU/CCU stay > 24 hours                              1  [ x ] Age > 60                                                      1  IMPROVE VTE Score:  1  IMPROVE Score 0-1: Low Risk, No VTE prophylaxis required for most patients, encourage ambulation. Chronic, stable

## 2020-12-18 NOTE — ED PROVIDER NOTE - PHYSICAL EXAMINATION
Gen: Alert, NAD  Head/eyes: NC/AT, PERRL  ENT: airway patent  Neck: supple, no tenderness/meningismus/JVD, Trachea midline  Pulm/lung: coarse breaths sounds b/l, right  more than left, normal resp effort, no wheeze/stridor/retractions  CV/heart: +tachycardia irregularly irregular, no M/R/G, +2 dist pulses (radial, pedal DP/PT, popliteal)  GI/Abd: soft, NT/ND, +BS, no guarding/rebound tenderness  Musculoskeletal: no edema/erythema/cyanosis, FROM in all extremities, no C/T/L spine ttp  Skin: no rash, no vesicles, no petechaie, no ecchymosis, no swelling  Neuro: AAOx3, CN 2-12 intact, normal sensation, 5/5 motor strength in all extremities, normal gait, no dysmetria

## 2020-12-18 NOTE — CONSULT NOTE ADULT - PROBLEM SELECTOR RECOMMENDATION 9
65 M history of Myasthenia gravis, HTN, left foot drop presents with worsening shortness of breath. Recently seen in [facility] ED 4 days ago for PNA. Admitted for acute hypoxic respiratory failure 2/2 aspiration PNA. Found to be in new onset Afib w/RVR at 149.   MG management  AF management - rate control - cvs rx regimen - Cardio assessment - BP control - tele monitoring - serial CE - TTE -   will check CTA Chest - eval PE - PNA - SOB - Effusion - Atelectasis -   emp ABX - on Zosyn now -   o2 support - keep sat > 88 pct  monitor VS and HD and Sat  I and O  hold off on full dose of AC - in case of need for thoracentesis  will follow
due to right sided pneumonia and small right sided pleural effusion  Was in the ER 4 days ago, COVID and RVP negative - was discharged on augmentin for CAP. Now back with worsening dyspnea and productive cough, noted with hypoxia which improved with nasal cannula.   CXR with nonspecific airspace opacity at R lung base, small pleural effusion  Repeat COVID-19 PCR negative  S/p vancomycin and pip-tazo in the ER  Continued on pip-tazo  CTA chest ordered.   Send for sputum culture   Follow for blood cultures  Legionella and S. pneumo urine antigens ordered   Pulmonary following  Patient being transferred to another [facility] for continued medical care given high hospital capacity related to COVID-19 pandemic.

## 2020-12-18 NOTE — H&P ADULT - PROBLEM SELECTOR PLAN 4
New onset afib  no hx of afib. EKG here revealed Afib w/ RVR at 140 bpm  - Hemodynamically stable. /80  - Hr now 139 s/p Cardizem 10mg IVP x1 in ED  -   - ChadsVasc score 2, moderate-high risk. Will begin anticoagulation with.... Chronic, stable  - continue home chronic, stable  - continue home lipitor

## 2020-12-18 NOTE — DISCHARGE NOTE PROVIDER - CARE PROVIDER_API CALL
Moon Torres  CRITICAL CARE MEDICINE  [address] [place] [address], [address]  [place] [address]  Phone: [phone]  Fax: [phone]  Established Patient  Follow Up Time: 1 week   Moon Torres  CRITICAL CARE MEDICINE  [address] [facility], [address]  [place] [address]  Phone: [phone]  Fax: [phone]  Established Patient  Follow Up Time: 1 week    Lei Najera  [id number]  [address] [place] [id number]  Phone: [phone]  Fax: [phone]  Established Patient  Follow Up Time: 1 week

## 2020-12-18 NOTE — DISCHARGE NOTE PROVIDER - NSDCCPCAREPLAN_GEN_ALL_CORE_FT
PRINCIPAL DISCHARGE DIAGNOSIS  Diagnosis: Pneumonia of lower lobe due to infectious organism, unspecified laterality  Assessment and Plan of Treatment: You were admitted for treatment of pneumonia that was not responding to outpatient antibiotics.  -You were treated with intravenous zosyn  -Follow up with your primary provider within one week of discharge      SECONDARY DISCHARGE DIAGNOSES  Diagnosis: Atrial fibrillation with rapid ventricular response  Assessment and Plan of Treatment: You were found to have a new onset arrythmia and fast heart rate during this admission.  -You were treated with intravenous diltiazem, a rate controlling medication  -Your home antihypertensive medications were held because your blood pressure was below the threshold for their administration  -Follow up with a cardiologist within one week of discharge.    Diagnosis: HLD (hyperlipidemia)  Assessment and Plan of Treatment: -continue your atorvastatin 10 mg daily at home.     PRINCIPAL DISCHARGE DIAGNOSIS  Diagnosis: Acute exacerbation of congestive heart failure  Assessment and Plan of Treatment: You were admitted for fluid in your lungs from pneumonia or heart failure. You were treated with IV Zosyn and IV Lasix. You were put on a BiPAP machine to help your breathing. You had an echo of your heart which revealed worsening valvular disease and were transferred to [place] for possible procedure and further workup. Further management per [place]. Continue all home medications unless otherwise indicated. Follow up with your PMD and cardiologist within 1 week of discharge.      SECONDARY DISCHARGE DIAGNOSES  Diagnosis: Atrial fibrillation with rapid ventricular response  Assessment and Plan of Treatment: You were found to have a new onset arrythmia and fast heart rate during this admission, likely secondary to your acute illness.  -You were treated with intravenous diltiazem, a rate controlling medication. You were started on Lopressor, which is another rate controlling medication.  -You were started on anticoagulation with Lovenox. Based on your risk factors, you should continue to take a blood thinner on discharge.  -Further management as per [place].  -Follow up with a cardiologist within one week of discharge.    Diagnosis: Pleural effusion  Assessment and Plan of Treatment: You had a CT angio of the chest which revealed fluid in the lungs, right worse than left. You were given IV diuresis.  -further management as per [place].    Diagnosis: HTN (hypertension)  Assessment and Plan of Treatment: You were previously diagnosed with hypertension.  -Your home antihypertensive medications were held to allow the administration of the rate controlling medications.  -Please STOP your home medications.  -further management as per [place]    Diagnosis: HLD (hyperlipidemia)  Assessment and Plan of Treatment: You were previously diagnosed with hyperlipidemia.  -your atorvastatin was increased from 10 to 40 mg daily. Please START Atorvastatin 40 mg daily.  -further management as per [place]

## 2020-12-18 NOTE — H&P ADULT - PROBLEM SELECTOR PLAN 6
DVT prophylaxis: will give one time dose of lovenox 40 for now. Will adjust anticoagulation per cardio recs later today.    IMPROVE VTE Individual Risk Assessment  RISK                                                                Points  [  ] Previous VTE                                                  3  [  ] Thrombophilia                                               2  [  ] Lower limb paralysis                                      2        (unable to hold up >15 seconds)    [  ] Current Cancer                                              2         (within 6 months)  [  ] Immobilization > 24 hrs                                1  [  ] ICU/CCU stay > 24 hours                              1  [ x ] Age > 60                                                      1  IMPROVE VTE Score:  1  IMPROVE Score 0-1: Low Risk, No VTE prophylaxis required for most patients, encourage ambulation.

## 2020-12-18 NOTE — H&P ADULT - ASSESSMENT
65 M history of Myasthenia gravis, HTN, left foot drop presents            65 M history of Myasthenia gravis, HTN, left foot drop presents with worsening shortness of breath. Recently seen in [facility] ED 4 days ago for PNA. Admitted for acute hypoxic respiratory failure 2/2 aspiration PNA. Found to be in new onset Afib w/RVR at 149.

## 2020-12-18 NOTE — PROGRESS NOTE ADULT - SUBJECTIVE AND OBJECTIVE BOX
Patient is a 65y old  Male who presents with a chief complaint of Aspiration PNA (18 Dec 2020 10:12)    INTERVAL HPI/OVERNIGHT EVENTS:  Pt seen and examined in ED. In new onset afib with persistent RVR. Cardizem drip titrated up to 20mg/hr with pt still at HR of 120s Plan is for pt to be transferred per hospital administration decision related to capacity and continued requirement for acute care. Admits SOB, blood tinged sputum, palpitations. Denies fever, chills, headaches, dizziness, blurred vision, difficulty swallowing, chest pain, numbness/tingling, abdominal pain, diarrhea.      T(C): 36.8 (12-18-20 @ 05:30), Max: 37.1 (12-18-20 @ 00:31)  HR: 123 (12-18-20 @ 08:54) (120 - 144)  BP: 105/61 (12-18-20 @ 08:54) (90/67 - 120/73)  RR: 20 (12-18-20 @ 08:54) (18 - 32)  SpO2: 94% (12-18-20 @ 08:54) (89% - 96%)  Wt(kg): --  I&O's Summary      LABS:                        13.3   9.54  )-----------( 266      ( 18 Dec 2020 07:14 )             38.2     12-18    136  |  100  |  11  ----------------------------<  112<H>  3.8   |  24  |  0.81    Ca    8.5      18 Dec 2020 07:14    TPro  6.5  /  Alb  2.8<L>  /  TBili  1.1  /  DBili  x   /  AST  15  /  ALT  28  /  AlkPhos  50  12-18    PT/INR - ( 18 Dec 2020 01:23 )   PT: 14.2 sec;   INR: 1.23 ratio         PTT - ( 18 Dec 2020 01:23 )  PTT:24.7 sec    CAPILLARY BLOOD GLUCOSE                MEDICATIONS  (STANDING):  atorvastatin 10 milliGRAM(s) Oral at bedtime  diltiazem Infusion 15 mG/Hr (15 mL/Hr) IV Continuous <Continuous>  piperacillin/tazobactam IVPB.. 3.375 Gram(s) IV Intermittent every 8 hours    MEDICATIONS  (PRN):      REVIEW OF SYSTEMS:  CONSTITUTIONAL: No weakness, fevers or chills  RESPIRATORY: No cough, wheezing, + hemoptysis; + shortness of breath  CARDIOVASCULAR: No chest pain, + palpitations  GASTROINTESTINAL: No abdominal or epigastric pain. No nausea, vomiting, or hematemesis; No diarrhea or constipation. No melena or hematochezia.  GENITOURINARY: No dysuria, frequency or hematuria  NEUROLOGICAL: No numbness or weakness  SKIN: No itching, burning, rashes, or lesions   All other review of systems is negative unless indicated above.    RADIOLOGY & ADDITIONAL TESTS:    Imaging Personally Reviewed:  [ x] YES  [ ] NO    Consultant(s) Notes Reviewed:  [x ] YES  [ ] NO    PHYSICAL EXAM:  GENERAL: patient appears well, no acute distress, appropriately interactive  EYES: sclera clear, no exudates, PERRL  LUNGS: diminished breath sounds over R midlung region and R lung base, +crackles right lung base  HEART: soft S1/S2, tachy rate and irregular rhythm, no murmurs noted, no lower extremity edema  GASTROINTESTINAL: abdomen is soft, nontender, nondistended, normoactive bowel sounds  INTEGUMENT: good skin turgor, warm skin, appears well perfused  MUSCULOSKELETAL: no clubbing or cyanosis, no obvious deformity  NEUROLOGIC: awake, alert, oriented x3, good muscle tone in all 4 extremities  HEME/LYMPH: no obvious ecchymosis or petechiae    Care Discussed with Consultants/Other Providers [x ] YES  [ ] NO Patient is a 65y old  Male who presents with a chief complaint of Aspiration PNA (18 Dec 2020 10:12)    INTERVAL HPI/OVERNIGHT EVENTS:  Pt seen and examined in ED. In new onset afib with persistent RVR. Cardizem drip titrated up to 20mg/hr with pt still at HR of 120s. s/p Lopressor 5 mg IVP Plan is for pt to be transferred per hospital administration decision related to capacity and continued requirement for acute care. Admits SOB, blood tinged sputum, palpitations. Denies fever, chills, headaches, dizziness, blurred vision, difficulty swallowing, chest pain, numbness/tingling, abdominal pain, diarrhea.      T(C): 36.8 (12-18-20 @ 05:30), Max: 37.1 (12-18-20 @ 00:31)  HR: 123 (12-18-20 @ 08:54) (120 - 144)  BP: 105/61 (12-18-20 @ 08:54) (90/67 - 120/73)  RR: 20 (12-18-20 @ 08:54) (18 - 32)  SpO2: 94% (12-18-20 @ 08:54) (89% - 96%)  Wt(kg): --  I&O's Summary      LABS:                        13.3   9.54  )-----------( 266      ( 18 Dec 2020 07:14 )             38.2     12-18    136  |  100  |  11  ----------------------------<  112<H>  3.8   |  24  |  0.81    Ca    8.5      18 Dec 2020 07:14    TPro  6.5  /  Alb  2.8<L>  /  TBili  1.1  /  DBili  x   /  AST  15  /  ALT  28  /  AlkPhos  50  12-18    PT/INR - ( 18 Dec 2020 01:23 )   PT: 14.2 sec;   INR: 1.23 ratio         PTT - ( 18 Dec 2020 01:23 )  PTT:24.7 sec    CAPILLARY BLOOD GLUCOSE                MEDICATIONS  (STANDING):  atorvastatin 10 milliGRAM(s) Oral at bedtime  diltiazem Infusion 15 mG/Hr (15 mL/Hr) IV Continuous <Continuous>  piperacillin/tazobactam IVPB.. 3.375 Gram(s) IV Intermittent every 8 hours    MEDICATIONS  (PRN):      REVIEW OF SYSTEMS:  CONSTITUTIONAL: No weakness, fevers or chills  RESPIRATORY: No cough, wheezing, + hemoptysis; + shortness of breath  CARDIOVASCULAR: No chest pain, + palpitations  GASTROINTESTINAL: No abdominal or epigastric pain. No nausea, vomiting, or hematemesis; No diarrhea or constipation. No melena or hematochezia.  GENITOURINARY: No dysuria, frequency or hematuria  NEUROLOGICAL: No numbness or weakness  SKIN: No itching, burning, rashes, or lesions   All other review of systems is negative unless indicated above.    RADIOLOGY & ADDITIONAL TESTS:    Imaging Personally Reviewed:  [ x] YES  [ ] NO    Consultant(s) Notes Reviewed:  [x ] YES  [ ] NO    PHYSICAL EXAM:  GENERAL: patient appears well, no acute distress, appropriately interactive  EYES: sclera clear, no exudates, PERRL  LUNGS: diminished breath sounds over R midlung region and R lung base, +crackles right lung base  HEART: soft S1/S2, tachy rate and irregular rhythm, no murmurs noted, no lower extremity edema  GASTROINTESTINAL: abdomen is soft, nontender, nondistended, normoactive bowel sounds  INTEGUMENT: good skin turgor, warm skin, appears well perfused  MUSCULOSKELETAL: no clubbing or cyanosis, no obvious deformity  NEUROLOGIC: awake, alert, oriented x3, good muscle tone in all 4 extremities  HEME/LYMPH: no obvious ecchymosis or petechiae    Care Discussed with Consultants/Other Providers [x ] YES  [ ] NO Patient is a 65y old  Male who presents with a chief complaint of Aspiration PNA (18 Dec 2020 10:12)    INTERVAL HPI/OVERNIGHT EVENTS:  Pt seen and examined in ED. In new onset afib with persistent RVR. Cardizem drip titrated up to 20mg/hr with pt still at HR of 120s. s/p Lopressor 5 mg IVP Plan is for pt to be transferred per hospital administration decision related to capacity and continued requirement for acute care. Admits SOB, blood tinged sputum, palpitations. Denies fever, chills, headaches, dizziness, blurred vision, difficulty swallowing, chest pain, numbness/tingling, abdominal pain, diarrhea.      T(C): 36.8 (12-18-20 @ 05:30), Max: 37.1 (12-18-20 @ 00:31)  HR: 123 (12-18-20 @ 08:54) (120 - 144)  BP: 105/61 (12-18-20 @ 08:54) (90/67 - 120/73)  RR: 20 (12-18-20 @ 08:54) (18 - 32)  SpO2: 94% (12-18-20 @ 08:54) (89% - 96%)  Wt(kg): --  I&O's Summary      LABS:                        13.3   9.54  )-----------( 266      ( 18 Dec 2020 07:14 )             38.2     12-18    136  |  100  |  11  ----------------------------<  112<H>  3.8   |  24  |  0.81    Ca    8.5      18 Dec 2020 07:14    TPro  6.5  /  Alb  2.8<L>  /  TBili  1.1  /  DBili  x   /  AST  15  /  ALT  28  /  AlkPhos  50  12-18    PT/INR - ( 18 Dec 2020 01:23 )   PT: 14.2 sec;   INR: 1.23 ratio         PTT - ( 18 Dec 2020 01:23 )  PTT:24.7 sec    CAPILLARY BLOOD GLUCOSE                MEDICATIONS  (STANDING):  atorvastatin 10 milliGRAM(s) Oral at bedtime  diltiazem Infusion 15 mG/Hr (15 mL/Hr) IV Continuous <Continuous>  piperacillin/tazobactam IVPB.. 3.375 Gram(s) IV Intermittent every 8 hours    MEDICATIONS  (PRN):      REVIEW OF SYSTEMS:  CONSTITUTIONAL: No weakness, fevers or chills  RESPIRATORY: No cough, wheezing, + hemoptysis; + shortness of breath  CARDIOVASCULAR: No chest pain, + palpitations  GASTROINTESTINAL: No abdominal or epigastric pain. No nausea, vomiting, or hematemesis; No diarrhea or constipation. No melena or hematochezia.  GENITOURINARY: No dysuria, frequency or hematuria  NEUROLOGICAL: No numbness or weakness  SKIN: No itching, burning, rashes, or lesions   All other review of systems is negative unless indicated above.    RADIOLOGY & ADDITIONAL TESTS:    Imaging Personally Reviewed:  [ x] YES  [ ] NO    Consultant(s) Notes Reviewed:  [x ] YES  [ ] NO    PHYSICAL EXAM:  GENERAL: patient appears well, no acute distress, appropriately interactive  EYES: sclera clear, no exudates, PERRL  LUNGS: diminished breath sounds over R midlung region and R lung base, +crackles right lung base  HEART: soft S1/S2, tachy rate and irregular rhythm, + murmur, no lower extremity edema  GASTROINTESTINAL: abdomen is soft, nontender, nondistended, normoactive bowel sounds  INTEGUMENT: good skin turgor, warm skin, appears well perfused  MUSCULOSKELETAL: no clubbing or cyanosis, no obvious deformity  NEUROLOGIC: awake, alert, oriented x3, good muscle tone in all 4 extremities  HEME/LYMPH: no obvious ecchymosis or petechiae    Care Discussed with Consultants/Other Providers [x ] YES  [ ] NO

## 2020-12-18 NOTE — ED PROVIDER NOTE - CARE PLAN
Principal Discharge DX:	Pneumonia of lower lobe due to infectious organism, unspecified laterality  Secondary Diagnosis:	Atrial fibrillation with rapid ventricular response

## 2020-12-18 NOTE — ED PROVIDER NOTE - NS ED ROS FT
Constitutional: - Fever, - Chills, - Anorexia, - Fatigue, - Night sweats  Eyes: - Discharge, - Irritation, - Redness, - Visual changes, - Light sensitivity, - Pain  EARS: - Ear Pain, - Tinnitus, - Decreased hearing  NOSE: - Congestion, - Bloody nose  MOUTH/THROAT: - Vocal Changes, - Drooling, - Sore throat  NECK: - Lumps, - Stiffness, - Pain  CV: + Palpitations, - Chest Pain, - Edema, - Syncope  RESP:  +Cough, +Shortness of Breath, - Dyspnea on Exertion, - Trouble speaking, - Pleuritic pain - Wheezing  GI: - Diarrhea, - Constipation, - Bloody stools, - Nausea, - Vomiting, - Abdominal Pain  : - Dysuria, -Frequency, - Hematuria, - Hesitancy, - Incontinence, - Saddle Anesthesia, - Abnormal discharge  MSK: - Myalgias, - Arthralgias, - Weakness, - Deformities, - Injuries  SKIN: - Color change, - Rash, - Swelling, - Ecchymosis, - Abrasion, - laceration  NEURO: - Change in behavior, - Dec. Alertness, - Headache, - Dizziness, - Change in speech, - Weakness, - Seizure-like activity, - Difficulty ambulating

## 2020-12-18 NOTE — DISCHARGE NOTE PROVIDER - NSDCFUADDINST_GEN_ALL_CORE_FT
44M hx of HLD c/b c/f statin induced rhabdo now off statin x2 weeks presents with a cc of b/l proximal UE and LE weakness, and diffuse pain, worsening over past few days, worse after prolonged exertion episode c/w prior of statin induced rhabdo however as been off statin, no point ttp, no rash, no swelling, no fevers. tolerating PO. no muscle fasciculations. Denies n/v/f/c/cp/sob. Denies headache, syncope, lightheadedness, dizziness. Denies chest palpitations, abdominal pain. Denies dysuria, hematuria, hematochezia, BRBPR, tarry stools, diarrhea, constipation. 44M hx of HLD c/b c/f statin induced rhabdo now off statin x2 weeks presents with a cc of b/l proximal UE and LE diffuse pain, worsening over past few days, worse after prolonged exertion episode c/w prior of statin induced rhabdo however as been off statin, no point ttp, no rash, no swelling, no fevers. tolerating PO. no muscle fasciculations. Denies n/v/f/c/cp/sob. Denies headache, syncope, lightheadedness, dizziness. Denies chest palpitations, abdominal pain. Denies dysuria, hematuria, hematochezia, BRBPR, tarry stools, diarrhea, constipation. - Please make an appointment for follow up with your primary doctor in 1-3 days  - Please make an appointment for follow up with                      your cardiologist  - Patient or caretaker is responsible for making all appointments  - Please return to the ED if you develop worsening symptoms or fever

## 2020-12-18 NOTE — H&P ADULT - HISTORY OF PRESENT ILLNESS
65 M history of Myasthenia gravis, HTN, left foot drop presents     Of note patient was recently seen in [facility] ED 4 days ago for SOB, myalgias and cough. Covid test was nebative. He was diagnosed with community aquired PNA and rxed 10 day course of amoxcillin-clavulanate        In the ED  VS: Hr 140, /77, RR 32 SpO2 89% on RA, T 98.7  Labs: WBC 8.97, HH 13.1/37.4, PLTS 194, Na 136, K 3.7, Cr .80, Gluc 121, Lactate 1.8  Chest Xray: (my read) right pleural effusion worsened when compared to 12/14  EKG:   Given in ED: Cardizem 10mg IVP x1, Zosyn IV x1, Vancomycin IV x 1, NS 1L bolus x1         65 M history of Myasthenia gravis, HTN, left foot drop presents     Of note patient was recently seen in [facility] ED 4 days ago for SOB, myalgias and cough. Covid test was nebative. He was diagnosed with community aquired PNA and rxed 10 day course of amoxcillin-clavulanate. He completed 4 days worth of antibiotics which initially helped with his myalgias and chills but he is now noting worsening of his dyspnea. He is also coughing up pink tinged sputum and notes an increase awareness of his heart rate. He denies any chest pain, neck pain, arm pain, diaphoresis, abdominal . He endorses orthopnea since sunday.         In the ED  VS: Hr 140, /77, RR 32 SpO2 89% on RA, T 98.7  Labs: WBC 8.97, HH 13.1/37.4, PLTS 194, Na 136, K 3.7, Cr .80, Gluc 121, Lactate 1.8  Chest Xray: (my read) right pleural effusion worsened when compared to 12/14  EKG: Afib w/ RVR at   Given in ED: Cardizem 10mg IVP x2, Zosyn IV x1, Vancomycin IV x 1, NS 1L bolus x1         65 M history of Myasthenia gravis, HTN, left foot drop presents     Of note patient was recently seen in [facility] ED 4 days ago for SOB, myalgias and cough. Covid test was nebative. He was diagnosed with community aquired PNA and rxed 10 day course of amoxcillin-clavulanate. Covid test was negative. He completed 4 days worth of antibiotics which initially helped with his myalgias and chills but he is now noting worsening of his dyspnea. He is also coughing up pink tinged sputum and notes an increase awareness of his heart rate. He denies any chest pain, neck pain, arm pain, diaphoresis, abdominal . He endorses orthopnea since sunday.         In the ED  VS: Hr 140, /77, RR 32 SpO2 89% on RA, T 98.7  Labs: WBC 8.97, HH 13.1/37.4, PLTS 194, Na 136, K 3.7, Cr .80, Gluc 121, Lactate 1.8  Chest Xray: (my read) right pleural effusion worsened when compared to 12/14  EKG: Afib w/ RVR at   Given in ED: Cardizem 10mg IVP x2, Zosyn IV x1, Vancomycin IV x 1, NS 1L bolus x1         65 M history of Myasthenia gravis, HTN, left foot drop presents     Of note patient was recently seen in [facility] ED 4 days ago for SOB, myalgias and cough. Covid test was nebative. He was diagnosed with community aquired PNA and rxed 10 day course of amoxcillin-clavulanate. Covid test was negative. He completed 4 days worth of antibiotics which initially helped with his myalgias and chills but he is now noting worsening of his dyspnea. He is also coughing up pink tinged sputum and notes an increase awareness of his heart rate. He denies any chest pain, neck pain, arm pain, diaphoresis, abdominal . He endorses orthopnea since sunday.         In the ED  VS: Hr 140, /77, RR 32 SpO2 89% on RA, T 98.7  Labs: WBC 8.97, HH 13.1/37.4, PLTS 194, Na 136, K 3.7, Cr .80, Gluc 121, Lactate 1.8  Chest Xray: (my read) right pleural effusion worsened when compared to 12/14  EKG: Afib w/ RVR at 147 bpm  Given in ED: Cardizem 10mg IVP x2, Zosyn IV x1, Vancomycin IV x 1, NS 1L bolus x1         65 M history of Myasthenia gravis, HTN, left foot drop presents     Of note patient was recently seen in [facility] ED 4 days ago for SOB, myalgias and cough. Covid test was nebative. He was diagnosed with community aquired PNA and rxed 10 day course of amoxcillin-clavulanate. Covid test was negative. He completed 4 days worth of antibiotics which initially helped with his myalgias and chills but he is now noting worsening of his dyspnea. He is also coughing up pink tinged sputum and notes an increase awareness of his heart rate. He denies any chest pain, neck pain, arm pain, diaphoresis, abdominal pain . He endorses orthopnea since sunday. No history of heart failure. Sees cardiologist Dr. Cirilo Najera for HTN and HLD. Nuclear stress test done 5 years ago was normal.         In the ED  VS: Hr 140, /77, RR 32 SpO2 89% on RA, T 98.7  Labs: WBC 8.97, HH 13.1/37.4, PLTS 194, Na 136, K 3.7, Cr .80, Gluc 121, Lactate 1.8  Chest Xray: (my read) right pleural effusion worsened when compared to 12/14  EKG: Afib w/ RVR at 147 bpm  Given in ED: Cardizem 10mg IVP x2, Zosyn IV x1, Vancomycin IV x 1, NS 1L bolus x1         65 M history of Myasthenia gravis, HTN, left foot drop presents     Of note patient was recently seen in [facility] ED 4 days ago for SOB, myalgias and cough. Covid test was nebative. He was diagnosed with community aquired PNA and rxed 10 day course of amoxcillin-clavulanate. Covid test was negative. He completed 4 days worth of antibiotics which initially helped with his myalgias and chills but he is now noting worsening of his dyspnea. He is also coughing up pink tinged sputum and notes an increase awareness of his heart rate. He denies any chest pain, neck pain, arm pain, diaphoresis, abdominal pain . He endorses orthopnea since sunday. No history of heart failure. Sees cardiologist Dr. Cirilo Najera for HTN and HLD. Nuclear stress test done 5 years ago was normal. He admits to eating stir luis food often.        In the ED  VS: Hr 140, /77, RR 32 SpO2 89% on RA, T 98.7  Labs: WBC 8.97, HH 13.1/37.4, PLTS 194, Na 136, K 3.7, Cr .80, Gluc 121, Lactate 1.8  Chest Xray: (my read) right pleural effusion worsened when compared to 12/14  EKG: Afib w/ RVR at 147 bpm  Given in ED: Cardizem 10mg IVP x2, Zosyn IV x1, Vancomycin IV x 1, NS 1L bolus x1         65 M history of Myasthenia gravis, HTN, left foot drop presents with shortness of breath. Patient was recently seen in [facility] ED 4 days ago for SOB, myalgias and cough. Covid test was nebative. He was diagnosed with community aquired PNA and rxed 10 day course of amoxcillin-clavulanate. Covid test was negative. He completed 4 days worth of antibiotics which initially helped with his myalgias and chills but he is now noting worsening of his dyspnea. He is also coughing up pink tinged sputum and notes an increase awareness of his heart rate. He denies any chest pain, neck pain, arm pain, diaphoresis, abdominal pain . He endorses orthopnea since sunday. No history of heart failure. Sees cardiologist Dr. Cirilo Najera for HTN and HLD. Nuclear stress test done 5 years ago was normal. He admits to eating stir luis food often.        In the ED  VS: Hr 140, /77, RR 32 SpO2 89% on RA, T 98.7  Labs: WBC 8.97, HH 13.1/37.4, PLTS 194, Na 136, K 3.7, Cr .80, Gluc 121, Lactate 1.8  Chest Xray: (my read) right pleural effusion worsened when compared to 12/14  EKG: Afib w/ RVR at 147 bpm  Given in ED: Cardizem 10mg IVP x2, Zosyn IV x1, Vancomycin IV x 1, NS 1L bolus x1         65 M history of Myasthenia gravis, HTN, left foot drop presents with shortness of breath. Patient was recently seen in [facility] ED 4 days ago for SOB, myalgias and cough. Covid test was nebative. He was diagnosed with community aquired PNA and rxed 10 day course of amoxcillin-clavulanate. Covid test was negative. He completed 4 days worth of antibiotics which initially helped with his myalgias and chills but he is now noting worsening of his dyspnea. He is also coughing up pink tinged sputum and notes an increase awareness of his heart rate. He denies any chest pain, neck pain, arm pain, diaphoresis, abdominal pain . He endorses orthopnea since sunday. No history of heart failure. Sees cardiologist Dr. Cirilo Najera for HTN and HLD. Nuclear stress test done 5 years ago was normal. He admits to eating stir luis food often.        In the ED  VS: Hr 140, /77, RR 32 SpO2 89% on RA, T 98.7  Labs: WBC 8.97, HH 13.1/37.4, PLTS 194, Na 136, K 3.7, Cr .80, Gluc 121, Lactate 1.8  Chest Xray: (my read) right pleural effusion worsened when compared to 12/14  EKG: Afib w/ RVR at 147 bpm  Given in ED: Cardizem 10mg IVP x3, Zosyn IV x1, Vancomycin IV x 1, NS 1L bolus x1

## 2020-12-18 NOTE — CONSULT NOTE ADULT - PROBLEM SELECTOR RECOMMENDATION 2
CXR with nonspecific airspace opacity at R lung base, small pleural effusion.   Now presented with worsening dyspnea and cough.   Plan as above. Continue on pip-tazo

## 2020-12-18 NOTE — CHART NOTE - NSCHARTNOTEFT_GEN_A_CORE
Due to high hospital capacity related to COVID-19  pandemic and patient's continued need for acute care in the hospital setting, per decision of hospital leadership and patient agreement, patient is to be [facility] medical care.  Attending of record notified.

## 2020-12-18 NOTE — ED ADULT NURSE REASSESSMENT NOTE - NS ED NURSE REASSESS COMMENT FT1
Report to GREG Corona @ [facility]. MD Perlman made aware pt family waiting for phone call regarding transfer and update.

## 2020-12-18 NOTE — CONSULT NOTE ADULT - SUBJECTIVE AND OBJECTIVE BOX
Patient is a 64 y/o male who presents with a chief complaint of aspiration PNA (18 Dec 2020 11:34)    BRIEF HOSPITAL COURSE: 64 y/o M w/ a HTN, HLD, myasthenia gravis, and an "arrythmia" who presented c/o dyspnea. Pt stated that on Saturday night 12/12, he felt tightness in his chest and "run down". On Sunday 12/13, he went to an urgent care center and tested negative for COVID-19. On Monday 12/14, pt presented to [facility] ED c/o dyspnea, cough, and body aches. Pt was diagnosed w/ PNA and discharged w/ a prescription for Augmentin. Today, pt returned to the ED w/ worsening dyspnea and coughing up pink tinged sputum. Labs significant for elevated BNP. COVID-19 PCR negative. CT chest revealing of b/l upper lobe ground glass opacities and b/l pleural effusions. Pt also noted to be in a fib w/ RVR (HR 140s-150s) upon arrival. Pt received Cardizem 10mg IV push x2, subsequently started on a Cardizem infusion w/o break in a fib. Pt was evaluated by cardiology who added Lasix 40mg BID and Lopressor 25mg q6 hrs. Due to increased work of breathing, pt was placed on BiPAP prompting ICU consult. Will transfer to ICU for continuation of care.    PAST MEDICAL & SURGICAL HISTORY:  History of left foot drop  Murmur, cardiac  Myasthenia gravis  HTN (hypertension)  History of back surgery    Allergies  No Known Allergies    Intolerances    FAMILY HISTORY:  FH: prostate cancer    FH: CHF (congestive heart failure)  mother    SOCIAL HISTORY:   Lives at home w/ wife and 3 daughters. Hx of tobacco abuse (1 pack per week), social EtOH use. Works as an .    Review of Systems:  CONSTITUTIONAL: (+) fatigue. No fever or chills.  EYES: No eye pain, visual disturbances, or discharge.  ENMT:  No difficulty hearing, tinnitus, or vertigo. No sinus or throat pain.  NECK: No pain or stiffness.  RESPIRATORY: (+) shortness of breath, (+) cough. No wheezing.  CARDIOVASCULAR: (+) palpitations. No chest pain, dizziness, or leg swelling.  GASTROINTESTINAL: No abdominal or epigastric pain. No nausea, vomiting,  diarrhea, or constipation. No hematemesis, melena, or hematochezia.  GENITOURINARY: No dysuria, frequency, hematuria, or incontinence.  NEUROLOGICAL: No headaches, memory loss, loss of strength, numbness, or tremors.  SKIN: No itching, burning, rashes, or lesions.  MUSCULOSKELETAL: No joint pain or swelling; No muscle, back, or extremity pain.  PSYCHIATRIC: No depression, anxiety, mood swings, or difficulty sleeping.    Medications:  piperacillin/tazobactam IVPB.. 3.375 Gram(s) IV Intermittent every 8 hours  diltiazem Infusion 15 mG/Hr IV Continuous <Continuous>  furosemide   Injectable 40 milliGRAM(s) IV Push two times a day  metoprolol tartrate 25 milliGRAM(s) Oral every 6 hours  enoxaparin Injectable 80 milliGRAM(s) SubCutaneous two times a day  atorvastatin 40 milliGRAM(s) Oral at bedtime    ICU Vital Signs Last 24 Hrs  T(C): 36.8 (18 Dec 2020 05:30), Max: 37.1 (18 Dec 2020 00:31)  T(F): 98.2 (18 Dec 2020 05:30), Max: 98.7 (18 Dec 2020 00:31)  HR: 137 (18 Dec 2020 15:12) (112 - 144)  BP: 126/77 (18 Dec 2020 15:12) (90/67 - 126/77)  BP(mean): --  ABP: --  ABP(mean): --  RR: 24 (18 Dec 2020 15:06) (18 - 32)  SpO2: 96% (18 Dec 2020 13:13) (89% - 96%)    Vital Signs Last 24 Hrs  T(C): 36.8 (18 Dec 2020 05:30), Max: 37.1 (18 Dec 2020 00:31)  T(F): 98.2 (18 Dec 2020 05:30), Max: 98.7 (18 Dec 2020 00:31)  HR: 137 (18 Dec 2020 15:12) (112 - 144)  BP: 126/77 (18 Dec 2020 15:12) (90/67 - 126/77)  BP(mean): --  RR: 24 (18 Dec 2020 15:06) (18 - 32)  SpO2: 96% (18 Dec 2020 13:13) (89% - 96%)    I&O's Detail    LABS:                        13.3   9.54  )-----------( 266      ( 18 Dec 2020 07:14 )             38.2     12-18    136  |  100  |  11  ----------------------------<  112<H>  3.8   |  24  |  0.81    Ca    8.5      18 Dec 2020 07:14    TPro  6.5  /  Alb  2.8<L>  /  TBili  1.1  /  DBili  x   /  AST  15  /  ALT  28  /  AlkPhos  50  12-18    CAPILLARY BLOOD GLUCOSE    PT/INR - ( 18 Dec 2020 01:23 )   PT: 14.2 sec;   INR: 1.23 ratio    PTT - ( 18 Dec 2020 01:23 )  PTT:24.7 sec    CULTURES:  Culture Results:   No growth to date. (12-14 @ 12:11)  Culture Results:   No growth to date. (12-14 @ 12:11)    Physical Examination:    General: In no acute distress.    HEENT: Pupils equal, reactive to light. Symmetric. No scleral icterus or injection.    PULM: Decreased breath sounds, rales at bases b/l.    NECK: Supple, no lymphadenopathy, trachea midline.    CVS: Irregular rate and rhythm, +s1/s2.    ABD: Soft, nondistended, nontender, normoactive bowel sounds.    EXT: No edema, nontender.    SKIN: Warm and well perfused, no rashes noted.    NEURO: Alert, oriented, interactive, nonfocal.    RADIOLOGY:  < from: CT Angio Chest w/ IV Cont (12.18.20 @ 14:39) >  EXAM:  CT ANGIO CHEST (W)AW IC                          PROCEDURE DATE:  12/18/2020      INTERPRETATION:  CLINICAL INFORMATION: Shortness of breath with atrial fibrillation. Evaluate for pulmonary embolism.    COMPARISON: None.    PROCEDURE:  CT Angiography of the Chest.  78 ml of Omnipaque 350 was injected intravenously. Sagittal and coronal reformats were performed as well as 3D (MIP) reconstructions.    FINDINGS:    LUNGS AND AIRWAYS: PLEURA:  There is a moderate size right-sided pleural effusion and small left-sided pleural effusion, both with underlying compressive atelectasis.    There are bilateral perihilar upper lobe and upper lobe groundglass and airspace consolidations, more pronounced on the right. The    The central airways are patent.    MEDIASTINUM AND TIFFANIE: No enlarged lymphadenopathy.    VESSELS: Evaluation of the subsegmental pulmonary arterial branches in the bilateral lower lobes is limited due to artifact. Otherwise, there is no CT evidence for acute pulmonaryembolism.    HEART: Heart size is normal. No pericardial effusion.    CHEST WALL AND LOWER NECK: Within normal limits.    VISUALIZED UPPER ABDOMEN:  Partially imaged is cystic lesion left upper abdomen, which could reflect a renal cyst.    BONES: Within normal limits.    IMPRESSION:    No acute pulmonary embolism demonstrated.    Bilateral upper lobe groundglass/airspace consolidation with bilateral pleural effusions and underlying compressive atelectasis  Findings may be secondary to pulmonary edema, cannot exclude superimposed infection.    Other findings as discussed above.    DANA NICOLAS M.D., ATTENDING RADIOLOGIST  This document has been electronically signed. Dec 18 2020  3:30PM    < end of copied text >    CRITICAL CARE TIME SPENT: ***

## 2020-12-18 NOTE — H&P ADULT - NSHPSOCIALHISTORY_GEN_ALL_CORE
Lives at home with wife and 3 daughters. former smoker 1 pack per week. Drinks 2 alcohol beverages per week. Works as an .

## 2020-12-18 NOTE — H&P ADULT - NSHPPHYSICALEXAM_GEN_ALL_CORE
T(C): 37.1 (12-18-20 @ 00:31), Max: 37.1 (12-18-20 @ 00:31)  HR: 120 (12-18-20 @ 04:50) (120 - 144)  BP: 117/79 (12-18-20 @ 04:50) (90/67 - 117/79)  RR: 20 (12-18-20 @ 04:50) (18 - 32)  SpO2: 93% (12-18-20 @ 04:50) (89% - 96%)    GENERAL: patient appears well, no acute distress, appropriately interactive  EYES: sclera clear, no exudates, PERRL  LUNGS: good air entry bilaterally, clear to auscultation, symmetric breath sounds, no wheezing +crackles right lung base  HEART: soft S1/S2, tachy rate and irregular rhythm, no murmurs noted, no lower extremity edema  GASTROINTESTINAL: abdomen is soft, nontender, nondistended, normoactive bowel sounds  INTEGUMENT: good skin turgor, warm skin, appears well perfused  MUSCULOSKELETAL: no clubbing or cyanosis, no obvious deformity  NEUROLOGIC: awake, alert, oriented x3, good muscle tone in all 4 extremities  HEME/LYMPH: no obvious ecchymosis or petechiae

## 2020-12-18 NOTE — CONSULT NOTE ADULT - SUBJECTIVE AND OBJECTIVE BOX
Date/Time Patient Seen:  		  Referring MD:   Data Reviewed	       Patient is a 65y old  Male who presents with a chief complaint of Aspiration PNA (18 Dec 2020 02:49)      Subjective/HPI  in bed  tachy  vs noted  labs reviewed  h and p reviewed  er provider note reviewed  cxr noted  awake  verbal  alert  non smoker  lives at home  works from home -    denies drug use     65 M history of Myasthenia gravis, HTN, left foot drop presents     Of note patient was recently seen in [facility] ED 4 days ago for SOB, myalgias and cough. Covid test was nebative. He was diagnosed with community aquired PNA and rxed 10 day course of amoxcillin-clavulanate. Covid test was negative. He completed 4 days worth of antibiotics which initially helped with his myalgias and chills but he is now noting worsening of his dyspnea. He is also coughing up pink tinged sputum and notes an increase awareness of his heart rate. He denies any chest pain, neck pain, arm pain, diaphoresis, abdominal pain . He endorses orthopnea since sunday. No history of heart failure. Sees cardiologist Dr. Cirilo Najera for HTN and HLD. Nuclear stress test done 5 years ago was normal.     FAMILY HISTORY:  FH: CHF (congestive heart failure), mother  FH: prostate cancer.     Social History:  Social History (marital status, living situation, occupation, tobacco use, alcohol and drug use, and sexual history): Lives at home with wife and 3 daughters. former smoker 1 pack per week. Drinks 2 alcohol beverages per week. Works as an .     Tobacco Screening:  · Core Measure Site	Yes  · Has the patient used tobacco in the past 30 days?	No    Risk Assessment:    Present on Admission:  Deep Venous Thrombosis	no  Pulmonary Embolus	no     Heart Failure:  Does this patient have a history of or has been diagnosed with heart failure? no.    PAST MEDICAL & SURGICAL HISTORY:  History of left foot drop    Murmur, cardiac    Myasthenia gravis    HTN (hypertension)    History of back surgery          Medication list         MEDICATIONS  (STANDING):  atorvastatin 10 milliGRAM(s) Oral at bedtime  diltiazem Infusion 5 mG/Hr (5 mL/Hr) IV Continuous <Continuous>  piperacillin/tazobactam IVPB.. 3.375 Gram(s) IV Intermittent every 8 hours    MEDICATIONS  (PRN):         Vitals log        ICU Vital Signs Last 24 Hrs  T(C): 36.8 (18 Dec 2020 05:30), Max: 37.1 (18 Dec 2020 00:31)  T(F): 98.2 (18 Dec 2020 05:30), Max: 98.7 (18 Dec 2020 00:31)  HR: 138 (18 Dec 2020 05:30) (120 - 144)  BP: 117/64 (18 Dec 2020 05:30) (90/67 - 117/79)  BP(mean): --  ABP: --  ABP(mean): --  RR: 18 (18 Dec 2020 05:30) (18 - 32)  SpO2: 96% (18 Dec 2020 05:30) (89% - 96%)           Input and Output:  I&O's Detail      Lab Data                        13.1   8.97  )-----------( 194      ( 18 Dec 2020 01:23 )             37.4     12-18    136  |  99  |  12  ----------------------------<  121<H>  3.7   |  26  |  0.80    Ca    8.3<L>      18 Dec 2020 01:23    TPro  6.1  /  Alb  3.0<L>  /  TBili  0.9  /  DBili  x   /  AST  15  /  ALT  29  /  AlkPhos  54  12-18            Review of Systems	  sob  gilmore    Objective     Physical Examination  heart s1s2  lung dec BS  abd soft  head nc  tachy  alert  verbal      Pertinent Lab findings & Imaging      Clements:  NO   Adequate UO     I&O's Detail           Discussed with:     Cultures:	        Radiology          EXAM:  XR CHEST AP OR PA 1V                            PROCEDURE DATE:  12/14/2020          INTERPRETATION:  CLINICAL STATEMENT: Chest Pain.    TECHNIQUE: AP view of the chest.    COMPARISON: None available.    FINDINGS/  IMPRESSION:  Nonspecific airspace opacity right lung base. Findings could represent pneumonia in the correct clinical setting. Follow-up recommended.    Small right pleural effusion    Heart size cannot be accurately assessed in this projection.

## 2020-12-18 NOTE — ED ADULT NURSE REASSESSMENT NOTE - NS ED NURSE REASSESS COMMENT FT1
MD Galaviz at bedside w/ patient to assess HR 130s. Pt received awake alert oriented x4, c/o SOB associated w/ increased HR. A fib on monitor, rales noted b/l lower lobes. Breathing even, tachypneic on 4L nc. CTM.

## 2020-12-18 NOTE — CONSULT NOTE ADULT - PROBLEM SELECTOR RECOMMENDATION 3
EKG showed afib with RVR, likely due to CAP and decompensated CHF, has a murmur on exam. Started on cardizem, lasix. CTA chest ordered to further assess for pneumonia and effusion. Cardiology following.

## 2020-12-18 NOTE — H&P ADULT - PROBLEM SELECTOR PLAN 2
Chronic, stable  - continue home [facility] ambulatory encounter  FAMILY PRACTICE OFFICE VISIT    CHIEF COMPLAINT:    Chief Complaint   Patient presents with   • Follow-up     medication follow up, discuss thyroid medication        SUBJECTIVE:  Josiah Pierce is a 48 year old male who presented requesting evaluation for hypothyroidism, hypertension. Patient established with me in November of last year and had low TSH. I had switched his medication from levothyroxine 200 MCG to 150 MCG daily. States he's been feeling more tired since the medication change. He states he isn't following Cipro and 8 PM every night. He typically falls around 11 PM he is also gained some weight. He states he does not believe the medication is fully responsible for the weight gain. With the holidays he was more junk food and sweets. He denies any heat or cold intolerance. No skin/hair/nail changes.    Patient has been compliant with hypertensive medication lisinopril 10 mg daily, hydrochlorothiazide 25 mg daily, amlodipine 10 mg daily. BPs been well-controlled. He does check at home occasionally and BP has been 130s over 80s.    Patient works at home, he does not get out often. He states he does not get much sun. Patient does not take any vitamin D currently.    Review of systems:   All other systems are reviewed and are negative except as documented in the history of present illness.     OBJECTIVE:  PROBLEM LIST:   Patient Active Problem List   Diagnosis   • Hypertension   • Headache(784.0)   • Unspecified hypothyroidism       PAST HISTORIES:   I have reviewed the past medical history, family history, social history, medications and allergies listed in the medical record as obtained by my nursing staff and support staff and agree with their documentation.    PHYSICAL EXAM:   Vital Signs:    Visit Vitals  /86 (BP Location: [facility])   Pulse 74   Temp 98.1 °F (36.7 °C) (Tympanic)   Resp 16   Ht 5' 11\" (1.803 m)   Wt 121.1 kg   BMI 37.24 kg/m²     Pulse Ox Interpretation:  Pulse  ox not performed  General:   Alert, cooperative, conversive in no acute distress.  Skin:  Warm and dry without rash.    Head:  Normocephalic, atraumatic.   Neck:  Trachea is midline.     Eyes:  Normal conjunctivae and sclerae.   ENT:  Mucous membranes are moist.   Cardiovascular:  Symmetrical pulses.  Regular rate and rhythm without murmur.  Respiratory:   Normal respiratory effort.  Clear to auscultation.  No wheezes, rales or rhonchi.   Musculoskeletal:  No deformity or edema.   Neurologic:  Oriented x4.  No focal deficits.  Psychiatric:  Cooperative.  Appropriate mood and affect.    LAB RESULTS:   All pertinent laboratory results were reviewed.  TSH (mcUnits/mL)   Date Value   11/05/2018 0.129 (L)       ASSESSMENT:   1. Hypothyroidism, unspecified type    2. Essential hypertension    3. Low vitamin D level      Hypothyroidism: Established problem, worsening. I changed his medication from 200 MCG to 100 MCG levothyroxine approximately 3 months ago. I am rechecking TSH today. Continue levothyroxine 150 MCG daily for now.    Hypertension: Established problem, stable. Continue lisinopril, amlodipine, and hydrochlorothiazide.    Low vitamin D level: Starting vitamin D supplement. Patient will continue this until spring or summer.    PLAN:   Orders Placed This Encounter   • Thyroid Stimulating Hormone   • Free T4   • Vitamin D, Ergocalciferol, 2000 units Cap           Return in about 6 months (around 8/4/2019) for HTN.    Instructions provided as documented in the after visit summary.    The patient indicated understanding of the diagnosis and agreed with the plan of care.     Rich Russell,      New onset afib  no hx of afib. EKG here revealed Afib w/ RVR at 140 bpm  - Hemodynamically stable. /80  - Hr now 139 s/p Cardizem 10mg IVP x1 in ED  -   - ChadsVasc score 2, moderate-high risk. Will begin anticoagulation with.... New onset afib w/ RVR  no hx of afib. EKG here revealed Afib w/ RVR at 140 bpm  - Hemodynamically stable. /80  - Hr now 139 s/p Cardizem 10mg IVP x3 in ED  - will start on cardizem gtt at 5mg/hr  - continue routine hemodynamic monitoring  - ChadsVasc score 2, moderate-high risk. Will begin anticoagulation with.... New onset afib w/ RVR  no hx of afib. EKG here revealed Afib w/ RVR at 140 bpm  - Hemodynamically stable. /80  - Hr now 139 s/p Cardizem 10mg IVP x3 in ED  - will start on cardizem gtt at 5mg/hr  - continue routine hemodynamic monitoring  - ChadsVasc score 2, moderate-high risk. Will give one time full dose lovenox. Further anticoagulation pending cardio recs. no hx of afib. EKG here revealed Afib w/ RVR at 147 bpm  - Hemodynamically stable. /80  - Hr now 139 s/p Cardizem 10mg IVP x3 in ED  - will start on cardizem gtt at 5mg/hr  - continue routine hemodynamic monitoring  - ChadsVasc score 2, moderate-high risk. Will give one time full dose lovenox. Further anticoagulation pending cardio recs. no hx of afib. EKG here revealed Afib w/ RVR at 147 bpm  - Hemodynamically stable. /80  - Hr now 139 s/p Cardizem 10mg IVP x3 in ED  - will start on cardizem gtt at 5mg/hr  - continue routine hemodynamic monitoring  - ChadsVasc score 2, moderate-high risk. Will give one time full dose lovenox. Further anticoagulation pending cardio recs.  - [facility] [employer] [employer] consulted. f/u recs no hx of afib. EKG here revealed Afib w/ RVR at 147 bpm  - Hemodynamically stable. /80  - Hr now 139 s/p Cardizem 10mg IVP x3 in ED  - started on cardizem gtt at 5mg/hr will titrate to 10mg/hr if needed.  - continue routine hemodynamic monitoring  - ChadsVasc score 2, moderate-high risk. Will give one time full dose lovenox. Further anticoagulation pending cardio recs.  - [facility] [employer] consulted. f/u recs no hx of afib. EKG here revealed Afib w/ RVR at 147 bpm  - Hemodynamically stable. /80  - Hr now 139 s/p Cardizem 10mg IVP x3 in ED  - started on cardizem gtt at 5mg/hr will titrate to 10mg/hr if needed.  - continue routine hemodynamic monitoring  - tele monitoring  - ChadsVasc score 2, moderate-high risk. Will give one time full dose lovenox. Further anticoagulation pending cardio recs.  - [facility] [employer] consulted. f/u recs

## 2020-12-18 NOTE — SWALLOW BEDSIDE ASSESSMENT ADULT - COMMENTS
Per charting, the patient is a "65 M history of Myasthenia gravis, HTN, left foot drop presents with shortness of breath. Patient was recently seen in [facility] ED 4 days ago for SOB, myalgias and cough. Covid test was nebative. He was diagnosed with community aquired PNA and rxed 10 day course of amoxcillin-clavulanate. Covid test was negative. He completed 4 days worth of antibiotics which initially helped with his myalgias and chills but he is now noting worsening of his dyspnea. He is also coughing up pink tinged sputum and notes an increase awareness of his heart rate. He denies any chest pain, neck pain, arm pain, diaphoresis, abdominal pain . He endorses orthopnea since sunday. No history of heart failure. Sees cardiologist Dr. Cirilo Najera for HTN and HLD. Nuclear stress test done 5 years ago was normal. He admits to eating stir luis food often."    Consult received and chart reviewed. The patient was seen at bedside this morning for an assessment of swallow function, at which time he was awake/alert and orientedX3. The patient was able to follow directives, communicate basic wants/needs and engage in conversational discourse with SLP. Patient reported decreased appetite.

## 2020-12-18 NOTE — ED ADULT NURSE REASSESSMENT NOTE - NS ED NURSE REASSESS COMMENT FT1
MD Sebastian called regarding change in patient condition per MD Perlman. Pt tachycardic to 140s, tachypneic 24, and hypoxic to 82% on 4L. No new orders at this time.

## 2020-12-18 NOTE — ED ADULT NURSE NOTE - OBJECTIVE STATEMENT
Pt . complaining of short of breath, tachypnea, tachycardia and cought with blood tinged sputum. Pt. stated he was seen in the ED and diagnosed with PNA, currently on po antibiotics. Denied chest pain. Pt. placed on 3L O2 via nasal cannula.

## 2020-12-18 NOTE — CONSULT NOTE ADULT - SUBJECTIVE AND OBJECTIVE BOX
[facility]         Jennyfer Gillis, Olivia, Romulo, Balta, Candelario, Renetta        [phone] (office)    Reason for Consult: Thomas      HPI: The patient is a 65 M history of Myasthenia gravis, HTN, left foot drop presents with shortness of breath. Patient was recently seen in [facility] ED 4 days ago for SOB, myalgias and cough. Covid test was nebative. He was diagnosed with community aquired PNA and rxed 10 day course of amoxcillin-clavulanate. Covid test was negative. He completed 4 days worth of antibiotics which initially helped with his myalgias and chills but he is now noting worsening of his dyspnea. He is also coughing up pink tinged sputum and notes an increase awareness of his heart rate. He denies any chest pain, neck pain, arm pain, diaphoresis, abdominal pain . He endorses orthopnea since sunday. No history of heart failure. Sees cardiologist Dr. Cirilo Najera for HTN and HLD. Nuclear stress test done 5 years ago was normal. He admits to eating stir luis food often.    Interval HPI: Patient seen and examined at bedside. According to the patient he was in his normal state of health when about 1 weeks ago started having cough which later became productive. Patient presented to the [place] ED 4 days ago and was d/c on Augmentin for community acquired PNA as his Cov       In the ED  VS: Hr 140, /77, RR 32 SpO2 89% on RA, T 98.7  Labs: WBC 8.97, HH 13.1/37.4, PLTS 194, Na 136, K 3.7, Cr .80, Gluc 121, Lactate 1.8  Chest Xray: (my read) right pleural effusion worsened when compared to 12/14  EKG: Afib w/ RVR at 147 bpm  Given in ED: Cardizem 10mg IVP x3, Zosyn IV x1, Vancomycin IV x 1, NS 1L bolus x1         (18 Dec 2020 02:49)      PAST MEDICAL & SURGICAL HISTORY:  History of left foot drop    Murmur, cardiac    Myasthenia gravis    HTN (hypertension)    History of back surgery        SOCIAL HISTORY: No active tobacco, alcohol or illicit drug use    FAMILY HISTORY:  FH: prostate cancer    FH: CHF (congestive heart failure)  mother        Home Medications:  amLODIPine 10 mg oral tablet: 1 tab(s) orally once a day (18 Dec 2020 05:09)  Lipitor 10 mg oral tablet: 1 tab(s) orally once a day (18 Dec 2020 05:09)  Metoprolol Succinate ER 25 mg oral tablet, extended release: 1 tab(s) orally once a day (18 Dec 2020 05:09)  triamterene-hydrochlorothiazide 37.5 mg-25 mg oral tablet: 1 tab(s) orally once a day (18 Dec 2020 05:09)      MEDICATIONS  (STANDING):  atorvastatin 10 milliGRAM(s) Oral at bedtime  diltiazem Infusion 15 mG/Hr (15 mL/Hr) IV Continuous <Continuous>  piperacillin/tazobactam IVPB.. 3.375 Gram(s) IV Intermittent every 8 hours    MEDICATIONS  (PRN):      Allergies    No Known Allergies    Intolerances        REVIEW OF SYSTEMS: Negative except as per HPI.    VITAL SIGNS:   Vital Signs Last 24 Hrs  T(C): 36.8 (18 Dec 2020 05:30), Max: 37.1 (18 Dec 2020 00:31)  T(F): 98.2 (18 Dec 2020 05:30), Max: 98.7 (18 Dec 2020 00:31)  HR: 123 (18 Dec 2020 08:54) (120 - 144)  BP: 105/61 (18 Dec 2020 08:54) (90/67 - 120/73)  BP(mean): --  RR: 20 (18 Dec 2020 08:54) (18 - 32)  SpO2: 94% (18 Dec 2020 08:54) (89% - 96%)    I&O's Summary      PHYSICAL EXAM:  Constitutional: NAD, well-developed  HEENT NC/AT, moist mucous membranes  Pulmonary: Non-labored, breath sounds are clear bilaterally, no wheezing, rales or rhonchi  Cardiovascular: +S1, S2, RRR, no murmur  Gastrointestinal: Soft, nontender, nondistended, normoactive bowel sounds  Extremities: No peripheral edema   Neurological: Alert, strength and sensitivity are grossly intact  Skin: No obvious lesions/rashes  Psych: Mood & affect appropriate    LABS: All Labs Reviewed:                        13.3   9.54  )-----------( 266      ( 18 Dec 2020 07:14 )             38.2                         13.1   8.97  )-----------( 194      ( 18 Dec 2020 01:23 )             37.4     18 Dec 2020 07:14    136    |  100    |  11     ----------------------------<  112    3.8     |  24     |  0.81   18 Dec 2020 01:23    136    |  99     |  12     ----------------------------<  121    3.7     |  26     |  0.80     Ca    8.5        18 Dec 2020 07:14  Ca    8.3        18 Dec 2020 01:23    TPro  6.5    /  Alb  2.8    /  TBili  1.1    /  DBili  x      /  AST  15     /  ALT  28     /  AlkPhos  50     18 Dec 2020 07:14  TPro  6.1    /  Alb  3.0    /  TBili  0.9    /  DBili  x      /  AST  15     /  ALT  29     /  AlkPhos  54     18 Dec 2020 01:23    PT/INR - ( 18 Dec 2020 01:23 )   PT: 14.2 sec;   INR: 1.23 ratio         PTT - ( 18 Dec 2020 01:23 )  PTT:24.7 sec      Blood Culture: Organism --  Gram Stain Blood -- Gram Stain --  Specimen Source .Blood Blood-Peripheral  Culture-Blood --      12-18 @ 01:23  Pro Bnp 3010        EKG:    RADIOLOGY:    CXR:   [facility]         Jennyfer Gillis, Olivia, Romulo, Balta, Candelario, Renetta        [phone] (office)    Reason for Consult: Thomas      HPI: The patient is a 65 M history of Myasthenia gravis, HTN, left foot drop presents with shortness of breath. Patient was recently seen in [facility] ED 4 days ago for SOB, myalgias and cough. Covid test was nebative. He was diagnosed with community aquired PNA and rxed 10 day course of amoxcillin-clavulanate. Covid test was negative. He completed 4 days worth of antibiotics which initially helped with his myalgias and chills but he is now noting worsening of his dyspnea. He is also coughing up pink tinged sputum and notes an increase awareness of his heart rate. He denies any chest pain, neck pain, arm pain, diaphoresis, abdominal pain . He endorses orthopnea since sunday. No history of heart failure. Sees cardiologist Dr. Cirilo Najera for HTN and HLD. Nuclear stress test done 5 years ago was normal. He admits to eating stir luis food often.    Interval HPI: Patient seen and examined at bedside. According to the patient he was in his normal state of health when about 1 weeks ago started having cough which later became productive. Patient presented to the [place] ED 4 days ago and was d/c on Augmentin for community acquired PNA. Covid was negative. Patient states that he     In the ED  VS: Hr 140, /77, RR 32 SpO2 89% on RA, T 98.7  Labs: WBC 8.97, HH 13.1/37.4, PLTS 194, Na 136, K 3.7, Cr .80, Gluc 121, Lactate 1.8  Chest Xray: (my read) right pleural effusion worsened when compared to 12/14  EKG: Afib w/ RVR at 147 bpm  Given in ED: Cardizem 10mg IVP x3, Zosyn IV x1, Vancomycin IV x 1, NS 1L bolus x1         (18 Dec 2020 02:49)      PAST MEDICAL & SURGICAL HISTORY:  History of left foot drop    Murmur, cardiac    Myasthenia gravis    HTN (hypertension)    History of back surgery        SOCIAL HISTORY: No active tobacco, alcohol or illicit drug use    FAMILY HISTORY:  FH: prostate cancer    FH: CHF (congestive heart failure)  mother        Home Medications:  amLODIPine 10 mg oral tablet: 1 tab(s) orally once a day (18 Dec 2020 05:09)  Lipitor 10 mg oral tablet: 1 tab(s) orally once a day (18 Dec 2020 05:09)  Metoprolol Succinate ER 25 mg oral tablet, extended release: 1 tab(s) orally once a day (18 Dec 2020 05:09)  triamterene-hydrochlorothiazide 37.5 mg-25 mg oral tablet: 1 tab(s) orally once a day (18 Dec 2020 05:09)      MEDICATIONS  (STANDING):  atorvastatin 10 milliGRAM(s) Oral at bedtime  diltiazem Infusion 15 mG/Hr (15 mL/Hr) IV Continuous <Continuous>  piperacillin/tazobactam IVPB.. 3.375 Gram(s) IV Intermittent every 8 hours    MEDICATIONS  (PRN):      Allergies    No Known Allergies    Intolerances        REVIEW OF SYSTEMS: Negative except as per HPI.    VITAL SIGNS:   Vital Signs Last 24 Hrs  T(C): 36.8 (18 Dec 2020 05:30), Max: 37.1 (18 Dec 2020 00:31)  T(F): 98.2 (18 Dec 2020 05:30), Max: 98.7 (18 Dec 2020 00:31)  HR: 123 (18 Dec 2020 08:54) (120 - 144)  BP: 105/61 (18 Dec 2020 08:54) (90/67 - 120/73)  BP(mean): --  RR: 20 (18 Dec 2020 08:54) (18 - 32)  SpO2: 94% (18 Dec 2020 08:54) (89% - 96%)    I&O's Summary      PHYSICAL EXAM:  Constitutional: NAD, well-developed  HEENT NC/AT, moist mucous membranes  Pulmonary: Non-labored, breath sounds are clear bilaterally, no wheezing, rales or rhonchi  Cardiovascular: +S1, S2, RRR, no murmur  Gastrointestinal: Soft, nontender, nondistended, normoactive bowel sounds  Extremities: No peripheral edema   Neurological: Alert, strength and sensitivity are grossly intact  Skin: No obvious lesions/rashes  Psych: Mood & affect appropriate    LABS: All Labs Reviewed:                        13.3   9.54  )-----------( 266      ( 18 Dec 2020 07:14 )             38.2                         13.1   8.97  )-----------( 194      ( 18 Dec 2020 01:23 )             37.4     18 Dec 2020 07:14    136    |  100    |  11     ----------------------------<  112    3.8     |  24     |  0.81   18 Dec 2020 01:23    136    |  99     |  12     ----------------------------<  121    3.7     |  26     |  0.80     Ca    8.5        18 Dec 2020 07:14  Ca    8.3        18 Dec 2020 01:23    TPro  6.5    /  Alb  2.8    /  TBili  1.1    /  DBili  x      /  AST  15     /  ALT  28     /  AlkPhos  50     18 Dec 2020 07:14  TPro  6.1    /  Alb  3.0    /  TBili  0.9    /  DBili  x      /  AST  15     /  ALT  29     /  AlkPhos  54     18 Dec 2020 01:23    PT/INR - ( 18 Dec 2020 01:23 )   PT: 14.2 sec;   INR: 1.23 ratio         PTT - ( 18 Dec 2020 01:23 )  PTT:24.7 sec      Blood Culture: Organism --  Gram Stain Blood -- Gram Stain --  Specimen Source .Blood Blood-Peripheral  Culture-Blood --      12-18 @ 01:23  Pro Bnp 3010        EKG:    RADIOLOGY:    CXR:   [facility]         Jennyfer Gillis, Olivia, Romulo, Balta, Candelario, Renetta        [phone] (office)    Reason for Consult: A.Fib      HPI: The patient is a 65 M history of Myasthenia gravis, HTN, left foot drop presents with shortness of breath. Patient was recently seen in [facility] ED 4 days ago for SOB, myalgias and cough. Covid test was nebative. He was diagnosed with community aquired PNA and rxed 10 day course of amoxcillin-clavulanate. Covid test was negative. He completed 4 days worth of antibiotics which initially helped with his myalgias and chills but he is now noting worsening of his dyspnea. He is also coughing up pink tinged sputum and notes an increase awareness of his heart rate. He denies any chest pain, neck pain, arm pain, diaphoresis, abdominal pain . He endorses orthopnea since sunday. No history of heart failure. Sees cardiologist Dr. Cirilo Najera for HTN and HLD. Nuclear stress test done 5 years ago was normal. He admits to eating stir luis food often.    Interval HPI: Patient seen and examined at bedside. According to the patient he was in his normal state of health when about 1 weeks ago started having cough which later became productive. Patient presented to the [place] ED 4 days ago and was d/c on Augmentin for community acquired PNA. Covid was negative. Patient states that he took ABx but was not feeling well. States that his cough was getting worse and started feeling that his HR is getting high as well. Used a home monitor and claims that it was in the 130's. Decided to come back to the ED. On arrival patient had a HE of 140'-150's and was in A.fib. Denies any chest pain, palpitations of diaphoresis. No N/V/D/C    In the ED  VS: Hr 140, /77, RR 32 SpO2 89% on RA, T 98.7  Labs: WBC 8.97, HH 13.1/37.4, PLTS 194, Na 136, K 3.7, Cr .80, Gluc 121, Lactate 1.8  Chest Xray: (my read) right pleural effusion worsened when compared to 12/14  EKG: Afib w/ RVR at 147 bpm  Given in ED: Cardizem 10mg IVP x3, Zosyn IV x1, Vancomycin IV x 1, NS 1L bolus x1         (18 Dec 2020 02:49)      PAST MEDICAL & SURGICAL HISTORY:  History of left foot drop    Murmur, cardiac    Myasthenia gravis    HTN (hypertension)    History of back surgery        SOCIAL HISTORY: No active tobacco, alcohol or illicit drug use    FAMILY HISTORY:  FH: prostate cancer    FH: CHF (congestive heart failure)  mother        Home Medications:  amLODIPine 10 mg oral tablet: 1 tab(s) orally once a day (18 Dec 2020 05:09)  Lipitor 10 mg oral tablet: 1 tab(s) orally once a day (18 Dec 2020 05:09)  Metoprolol Succinate ER 25 mg oral tablet, extended release: 1 tab(s) orally once a day (18 Dec 2020 05:09)  triamterene-hydrochlorothiazide 37.5 mg-25 mg oral tablet: 1 tab(s) orally once a day (18 Dec 2020 05:09)      MEDICATIONS  (STANDING):  atorvastatin 10 milliGRAM(s) Oral at bedtime  diltiazem Infusion 15 mG/Hr (15 mL/Hr) IV Continuous <Continuous>  piperacillin/tazobactam IVPB.. 3.375 Gram(s) IV Intermittent every 8 hours    MEDICATIONS  (PRN):      Allergies    No Known Allergies    Intolerances        REVIEW OF SYSTEMS: Negative except as per HPI.    VITAL SIGNS:   Vital Signs Last 24 Hrs  T(C): 36.8 (18 Dec 2020 05:30), Max: 37.1 (18 Dec 2020 00:31)  T(F): 98.2 (18 Dec 2020 05:30), Max: 98.7 (18 Dec 2020 00:31)  HR: 123 (18 Dec 2020 08:54) (120 - 144)  BP: 105/61 (18 Dec 2020 08:54) (90/67 - 120/73)  BP(mean): --  RR: 20 (18 Dec 2020 08:54) (18 - 32)  SpO2: 94% (18 Dec 2020 08:54) (89% - 96%)    I&O's Summary      PHYSICAL EXAM:  Constitutional: NAD, well-developed  HEENT NC/AT, moist mucous membranes  Pulmonary: decreased breath sounds at the bases B/L, mild rales noted in the lower lung fields B/L  Cardiovascular: Irregular rate and rhythm normal +S1, S2, late systolic murmur heard best at the mitral area most likely Mitral regurge  Gastrointestinal: Soft, nontender, nondistended, normoactive bowel sounds  Extremities: No peripheral edema   Neurological: Alert, strength and sensitivity are grossly intact  Skin: No obvious lesions/rashes  Psych: Mood & affect appropriate    LABS: All Labs Reviewed:                        13.3   9.54  )-----------( 266      ( 18 Dec 2020 07:14 )             38.2                         13.1   8.97  )-----------( 194      ( 18 Dec 2020 01:23 )             37.4     18 Dec 2020 07:14    136    |  100    |  11     ----------------------------<  112    3.8     |  24     |  0.81   18 Dec 2020 01:23    136    |  99     |  12     ----------------------------<  121    3.7     |  26     |  0.80     Ca    8.5        18 Dec 2020 07:14  Ca    8.3        18 Dec 2020 01:23    TPro  6.5    /  Alb  2.8    /  TBili  1.1    /  DBili  x      /  AST  15     /  ALT  28     /  AlkPhos  50     18 Dec 2020 07:14  TPro  6.1    /  Alb  3.0    /  TBili  0.9    /  DBili  x      /  AST  15     /  ALT  29     /  AlkPhos  54     18 Dec 2020 01:23    PT/INR - ( 18 Dec 2020 01:23 )   PT: 14.2 sec;   INR: 1.23 ratio         PTT - ( 18 Dec 2020 01:23 )  PTT:24.7 sec      Blood Culture: Organism --  Gram Stain Blood -- Gram Stain --  Specimen Source .Blood Blood-Peripheral  Culture-Blood --      12-18 @ 01:23  Pro Bnp 3010        EKG:    RADIOLOGY:    CXR:    EXAM:  XR CHEST AP OR PA 1V                            PROCEDURE DATE:  12/14/2020          INTERPRETATION:  CLINICAL STATEMENT: Chest Pain.    TECHNIQUE: AP view of the chest.    COMPARISON: None available.    FINDINGS/  IMPRESSION:  Nonspecific airspace opacity right lung base. Findings could represent pneumonia in the correct clinical setting. Follow-up recommended.    Small right pleural effusion    Heart size cannot be accurately assessed in this projection.     [facility]         Jennyfer Gillis, Olivia, Romulo, Balta, Candelario, Renetta        [phone] (office)    Reason for Consult: [email or web address]      HPI: The patient is a 65 M history of Myasthenia gravis, HTN, left foot drop presents with shortness of breath. Patient was recently seen in [facility] ED 4 days ago for SOB, myalgias and cough. Covid test was negative. He was diagnosed with community aquired PNA and rxed 10 day course of amoxcillin-clavulanate. Of note he had a right pleural eff and an elevated bnp level at that time. He completed 4 days worth of antibiotics which initially helped with his myalgias and chills but he is noting worsening of his dyspnea. He is also coughing up pink tinged sputum and notes an increase awareness of his heart rate. There was no clear abrupt change in his heart rate but it has def been elevated over the past 24h, as measured by a sat monitor he has at home. He denies any chest pain, neck pain, arm pain, diaphoresis, abdominal pain . He endorses orthopnea since Sunday. No history of heart failure. Sees cardiologist Dr. Cirilo Najera for HTN and HLD. Nuclear stress test done 5 years ago was normal.  There is a reported hx of a "heart murmur", which has not been evaluated recently with an echo or any other cv resting.  On arrival patient had a HR of 140'-150's in [facility]. He had been tachycardic when here earlier in the week, but was in sr at that time.            PAST MEDICAL & SURGICAL HISTORY:  History of left foot drop    Murmur, cardiac    Myasthenia gravis    HTN (hypertension)    History of back surgery        SOCIAL HISTORY: No active tobacco, alcohol or illicit drug use    FAMILY HISTORY:  FH: prostate cancer    FH: CHF (congestive heart failure)  mother        Home Medications:  amLODIPine 10 mg oral tablet: 1 tab(s) orally once a day (18 Dec 2020 05:09)  Lipitor 10 mg oral tablet: 1 tab(s) orally once a day (18 Dec 2020 05:09)  Metoprolol Succinate ER 25 mg oral tablet, extended release: 1 tab(s) orally once a day (18 Dec 2020 05:09)  triamterene-hydrochlorothiazide 37.5 mg-25 mg oral tablet: 1 tab(s) orally once a day (18 Dec 2020 05:09)      MEDICATIONS  (STANDING):  atorvastatin 10 milliGRAM(s) Oral at bedtime  diltiazem Infusion 15 mG/Hr (15 mL/Hr) IV Continuous <Continuous>  piperacillin/tazobactam IVPB.. 3.375 Gram(s) IV Intermittent every 8 hours    MEDICATIONS  (PRN):      Allergies    No Known Allergies    Intolerances        REVIEW OF SYSTEMS: Negative except as per HPI.    VITAL SIGNS:   Vital Signs Last 24 Hrs  T(C): 36.8 (18 Dec 2020 05:30), Max: 37.1 (18 Dec 2020 00:31)  T(F): 98.2 (18 Dec 2020 05:30), Max: 98.7 (18 Dec 2020 00:31)  HR: 123 (18 Dec 2020 08:54) (120 - 144)  BP: 105/61 (18 Dec 2020 08:54) (90/67 - 120/73)  BP(mean): --  RR: 20 (18 Dec 2020 08:54) (18 - 32)  SpO2: 94% (18 Dec 2020 08:54) (89% - 96%)    I&O's Summary      PHYSICAL EXAM:  Constitutional: NAD, well-developed  HEENT NC/AT, moist mucous membranes  Pulmonary: decreased breath sounds at the bases B/L, mild rales noted in the lower lung fields B/L  Cardiovascular: Irregular rate and rhythm normal +S1, S2, holosystolic murmur heard best at the mitral area  Gastrointestinal: Soft, nontender, nondistended, normoactive bowel sounds  Extremities: No peripheral edema   Neurological: Alert, strength and sensitivity are grossly intact  Skin: No obvious lesions/rashes  Psych: Mood & affect appropriate    LABS: All Labs Reviewed:                        13.3   9.54  )-----------( 266      ( 18 Dec 2020 07:14 )             38.2                         13.1   8.97  )-----------( 194      ( 18 Dec 2020 01:23 )             37.4     18 Dec 2020 07:14    136    |  100    |  11     ----------------------------<  112    3.8     |  24     |  0.81   18 Dec 2020 01:23    136    |  99     |  12     ----------------------------<  121    3.7     |  26     |  0.80     Ca    8.5        18 Dec 2020 07:14  Ca    8.3        18 Dec 2020 01:23    TPro  6.5    /  Alb  2.8    /  TBili  1.1    /  DBili  x      /  AST  15     /  ALT  28     /  AlkPhos  50     18 Dec 2020 07:14  TPro  6.1    /  Alb  3.0    /  TBili  0.9    /  DBili  x      /  AST  15     /  ALT  29     /  AlkPhos  54     18 Dec 2020 01:23    PT/INR - ( 18 Dec 2020 01:23 )   PT: 14.2 sec;   INR: 1.23 ratio         PTT - ( 18 Dec 2020 01:23 )  PTT:24.7 sec      Blood Culture: Organism --  Gram Stain Blood -- Gram Stain --  Specimen Source .Blood Blood-Peripheral  Culture-Blood --      12-18 @ 01:23  Pro Bnp 3010        EKG: af rvr, pvcs     CXR:    EXAM:  XR CHEST AP OR PA 1V                            PROCEDURE DATE:  12/14/2020          INTERPRETATION:  CLINICAL STATEMENT: Chest Pain.    TECHNIQUE: AP view of the chest.    COMPARISON: None available.    FINDINGS/  IMPRESSION:  Nonspecific airspace opacity right lung base. Findings could represent pneumonia in the correct clinical setting. Follow-up recommended.    Small right pleural effusion    Heart size cannot be accurately assessed in this projection.

## 2020-12-18 NOTE — CONSULT NOTE ADULT - ATTENDING COMMENTS
Av Palomino M.D.  [facility], Division of Infectious Diseases  [phone]  After 5pm on weekdays and all day on weekends - please call [phone]
66 yo man with MG, htn with episode of chest tightness and fatigue starting 12/12.  Visited  center and then  ED, diagnosed with CAP and COVID negative, now returning with worsening dyspnea, tachycardia, blood streaked sputum, n/v, anorexia.  Found to be in new onset Afib with RBR and acute hypoxemic respiratory failure requiring BiPAP.  DDx decompensated HF and afib, +/- CAP.    admit to ICU.  respiratory status improved on BiPAP  Afib, on dilt gtt, lovenox AC  diurese  normal renal function  NPO until respiratory status improves  zosyn for failed outpt CAP treatment, pending Cx data  plan discussed with pt
The patient was personally seen and examined, in addition to being examined and evaluated by housestaff.  All elements of the note were edited where appropriate.    resp sxs 12/14 with elevated bnp level and right pleural eff then   treated as cap  now with apparent decomp hf with rapid af  unclear to what extent resp sxs may correlate with heart failure, rapid af, cap, effusion, valvular heart disease  may have sig valve disease based on exam, with murmur suggesting mr. uncertain to what degree this may be causing the hf and the af  rate control with ccb and the addition of bb  ac  tele  diuresis, trend bnp, cxr, i/o, weights  ct chest to assess size of effusion and need for tap  discussed at length

## 2020-12-18 NOTE — CONSULT NOTE ADULT - SUBJECTIVE AND OBJECTIVE BOX
[facility], Division of Infectious Diseases  JENNIFER Dennison, ELISABETH Persaud  [phone]  ([phone] - weekdays after 5pm and weekends)    MAGGIE RICHARD  65y, Male  [id number]    HPI:  Patient is a 65 year old male with PMH of myasthenia gravis, HTN, left foot drop who presented with complaint of shortness of breath. He was recently seen in [place] [facility] 4 Monday for complaint of dyspnea, myalgias and coughing, he was COVID-19 negative and was discharged home on augmentin for 10 days for community acquired pneumonia. Patient completed 4 days of augmentin but noted he was having worsening dyspnea and elevated heartrate. Patient states he also noted his cough was productive of blood/pink tinged sputum. He reports nausea with vomiting earlier but has improved now. He has a poor appetite but states he has been eating, states he had been eating stir luis often. He had reported orthopnea since Sunday. Reports myalgias did improve, he denies having any fever or chills. He denies headache, sore throat, rhinorrhea, sinus pain or pressure, dizziness, chest pain, abdominal pain, diarrhea, dysuria, leg swelling. Denies having any sick contacts, lives at home with wife and 2 daughters who are doing well. He follows with cardiologist Dr. Cirilo Najera for HTN and HLD. Nuclear stress test done 5 years ago was normal.   Patient in the ER afebrile tachycardic, /77, saturating 89% on room air. Labs showed normal white count, Cr 0.8, lactate 1.8. CXR with nonspecific airspace opacity at R lung base, small pleural effusion. EKG showed afib with RVR, he was started on cardizem. Patient given vancomycin and pip-tazo in the ER and fluids. ID consulted for pneumonia.   Patient seen this morning in the ER, reports he feels slightly better with nasal cannula. Continues to have blood tinged sputum with coughing. Denies having any fever or chills while in the ER, denies chest pain, abd pain, nausea, vomiting, diarrhea.   ROS: 14 point review of systems completed, pertinent positives and negatives as per HPI.    Allergies: No Known Allergies    PMH -- History of left foot drop  Murmur, cardiac  Myasthenia gravis  HTN (hypertension)    PSH -- History of back surgery    FH -- FH: prostate cancer  FH: CHF (congestive heart failure)    Social History -- former smoker - smoked in college, drinks alcohol socially, denies illicit drug use, , lives at home with wife and 2 daughters,     Physical Exam--  Vital Signs Last 24 Hrs  T(F): 98.2 (18 Dec 2020 05:30), Max: 98.7 (18 Dec 2020 00:31)  HR: 123 (18 Dec 2020 08:54) (120 - 144)  BP: 105/61 (18 Dec 2020 08:54) (90/67 - 120/73)  RR: 20 (18 Dec 2020 08:54) (18 - 32)  SpO2: 94% (18 Dec 2020 08:54) (89% - 96%)  General: no acute distress, NC  HEENT: NC/AT, EOMI, anicteric, conjunctiva pink and moist, neck supple  Lungs: Decreased breath sounds, few rales on right, no wheezing or rhonchi  Heart: Regular rate and rhythm. +murmur, no rub or gallop.  Abdomen: Soft. Nondistended. Nontender. BS present.  Neuro: AAOx3, no obvious focal deficits   Back: No spinal tenderness. No costovertebral angle tenderness.  Extremities: No cyanosis or clubbing. No edema.   Skin: Warm. Dry. Good turgor. No rash.   Psychiatric: Appropriate affect and mood for situation.   Lines: PIV    Laboratory & Imaging Data--  CBC:                       13.3   9.54  )-----------( 266      ( 18 Dec 2020 07:14 )             38.2     CMP: 12-18    136  |  100  |  11  ----------------------------<  112<H>  3.8   |  24  |  0.81    Ca    8.5      18 Dec 2020 07:14    TPro  6.5  /  Alb  2.8<L>  /  TBili  1.1  /  DBili  x   /  AST  15  /  ALT  28  /  AlkPhos  50  12-18    LIVER FUNCTIONS - ( 18 Dec 2020 07:14 )  Alb: 2.8 g/dL / Pro: 6.5 g/dL / ALK PHOS: 50 U/L / ALT: 28 U/L / AST: 15 U/L / GGT: x           Microbiology:   12/18 - COVID-19 PCR - negative  12/14 - blood cultures - NGTD x2  12/14 - RVP + COVID-19 by SADIA - negative    Radiology--  Xray Chest 1 View AP/PA (12.14.20 @ 09:34) > IMPRESSION: Nonspecific airspace opacity right lung base. Findings could represent pneumonia in the correct clinical setting. Follow-up recommended. Small right pleural effusion Heart size cannot be accurately assessed in this projection.    Active Medications--  atorvastatin 10 milliGRAM(s) Oral at bedtime  diltiazem Infusion 15 mG/Hr IV Continuous <Continuous>  furosemide   Injectable 40 milliGRAM(s) IV Push two times a day  metoprolol tartrate Injectable 5 milliGRAM(s) IV Push once  piperacillin/tazobactam IVPB.. 3.375 Gram(s) IV Intermittent every 8 hours    Antimicrobials:   piperacillin/tazobactam IVPB.. 3.375 Gram(s) IV Intermittent every 8 hours - started 12/18

## 2020-12-18 NOTE — ED ADULT NURSE REASSESSMENT NOTE - NS ED NURSE REASSESS COMMENT FT1
Dr. Lawrence called asking for the latest HR= 137/min, ordered to increase Cardizem drip to 10mg/hr. Primary RN Heavenly made aware.

## 2020-12-18 NOTE — ED PROVIDER NOTE - OBJECTIVE STATEMENT
66 yo male hx of htn, MG, recently diagnosed with pneumonia 4 days ago in ED, on augmentin c/o shortness of breath, and palpitations, feeling his heart is racing, no fever/chills.  +cough, had covid test 4 days ago that was negative.

## 2020-12-18 NOTE — PROGRESS NOTE ADULT - PROBLEM SELECTOR PLAN 6
DVT prophylaxis: will give one time dose of lovenox 40 for now. Will adjust anticoagulation per cardio recs later today.    IMPROVE VTE Individual Risk Assessment  RISK                                                                Points  [  ] Previous VTE                                                  3  [  ] Thrombophilia                                               2  [  ] Lower limb paralysis                                      2        (unable to hold up >15 seconds)    [  ] Current Cancer                                              2         (within 6 months)  [  ] Immobilization > 24 hrs                                1  [  ] ICU/CCU stay > 24 hours                              1  [ x ] Age > 60                                                      1  IMPROVE VTE Score:  1  IMPROVE Score 0-1: Low Risk, No VTE prophylaxis required for most patients, encourage ambulation. DVT prophylaxis: s/p one time dose of lovenox 80 for now. hold ac in case pt needs thoracentesis    IMPROVE VTE Individual Risk Assessment  RISK                                                                Points  [  ] Previous VTE                                                  3  [  ] Thrombophilia                                               2  [  ] Lower limb paralysis                                      2        (unable to hold up >15 seconds)    [  ] Current Cancer                                              2         (within 6 months)  [  ] Immobilization > 24 hrs                                1  [  ] ICU/CCU stay > 24 hours                              1  [ x ] Age > 60                                                      1  IMPROVE VTE Score:  1  IMPROVE Score 0-1: Low Risk, No VTE prophylaxis required for most patients, encourage ambulation. Lopessor 80 mg IV BID for new onset Afib    IMPROVE VTE Individual Risk Assessment  RISK                                                                Points  [  ] Previous VTE                                                  3  [  ] Thrombophilia                                               2  [  ] Lower limb paralysis                                      2        (unable to hold up >15 seconds)    [  ] Current Cancer                                              2         (within 6 months)  [  ] Immobilization > 24 hrs                                1  [  ] ICU/CCU stay > 24 hours                              1  [ x ] Age > 60                                                      1  IMPROVE VTE Score:  1  IMPROVE Score 0-1: Low Risk, No VTE prophylaxis required for most patients, encourage ambulation.

## 2020-12-18 NOTE — PROGRESS NOTE ADULT - PROBLEM SELECTOR PLAN 3
Chronic  - BP is on softer side now s/p cardizem 10mg IVP x3.  - will hold home amlodipine, metoprolol for now  - started on cardizem gtt at 5mg/hr will titrate to 10mg/hr if needed.  - routine HD monitoring Chronic  - BP is on softer side now s/p cardizem 10mg IVP x3.  - will hold home amlodipine, metoprolol, triamterene HCTZ, for now  - routine HD monitoring

## 2020-12-18 NOTE — DISCHARGE NOTE PROVIDER - NSDCMRMEDTOKEN_GEN_ALL_CORE_FT
atorvastatin 10 mg oral tablet: 1 tab(s) orally once a day (at bedtime)  dilTIAZem: Infuse at 15cc/hr  piperacillin-tazobactam: IV intermittent q 8 hrs  triamterene-hydrochlorothiazide 37.5 mg-25 mg oral tablet: 1 tab(s) orally once a day   atorvastatin 10 mg oral tablet: 1 tab(s) orally once a day (at bedtime)  dilTIAZem: Infuse at 15cc/hr  piperacillin-tazobactam: IV intermittent q 8 hrs   amLODIPine 10 mg oral tablet: 1 tab(s) orally once a day  aspirin 81 mg oral delayed release tablet: 1 tab(s) orally once a day  atorvastatin 40 mg oral tablet: 1 tab(s) orally once a day  metoprolol succinate 25 mg oral tablet, extended release: 1 tab(s) orally once a day  triamterene-hydrochlorothiazide 37.5 mg-25 mg oral capsule: 1 cap(s) orally once a day   aspirin 81 mg oral delayed release tablet: 1 tab(s) orally once a day  atorvastatin 40 mg oral tablet: 1 tab(s) orally once a day (at bedtime)  dilTIAZem: 15 milligram(s)/hr by continuous intravenous infusion  enoxaparin: 80 milligram(s) subcutaneous 2 times a day  furosemide 100 mg/100 mL-0.9% intravenous solution: 40 milliliter(s) intravenous 2 times a day  metoprolol tartrate 25 mg oral tablet: 1 tab(s) orally every 6 hours  piperacillin-tazobactam: 3.375 gram(s) intravenous every 8 hours

## 2020-12-18 NOTE — DISCHARGE NOTE PROVIDER - CARE PROVIDERS DIRECT ADDRESSES
[email or web address]

## 2020-12-18 NOTE — CONSULT NOTE ADULT - ASSESSMENT
Patient is a 65 year old male with PMH of myasthenia gravis, HTN, left foot drop who presented with complaint of worsening shortness of breath and productive cough and admitted for pneumonia.

## 2020-12-18 NOTE — PROGRESS NOTE ADULT - ASSESSMENT
65 M history of Myasthenia gravis, HTN, left foot drop presents with worsening shortness of breath. Recently seen in [facility] ED 4 days ago for PNA. Admitted for acute hypoxic respiratory failure 2/2 aspiration PNA. Found to be in new onset Afib w/RVR at 149.

## 2020-12-18 NOTE — PROGRESS NOTE ADULT - PROBLEM SELECTOR PLAN 1
Acute hypoxic respiratory failure likely 2/2 aspiration PNA likely 2/2 aspiration PNA given right side pleural effusion. Patient was seen in [facility] 4 days ago, covid pcr negative at that time. Now returning with worsening symptoms and hypoxia.   - s/p Zosyn IV x1, Vancomycin IV x 1 in ED  - COVID pending  - D-dimer, ferritin, CRP added to lab draw, f/u  - f/u blood cultures, urine cultures, procal, legionella, strep pneumo antigen urine  - will continue zosyn for now.  - ID consulted, f/u recs  - Abdirahman Espinosa consulted, f/u recs Acute hypoxic respiratory failure likely 2/2 aspiration PNA likely 2/2 aspiration PNA given right side pleural effusion. Patient was seen in [facility] 4 days ago, covid pcr negative at that time. Now returning with worsening symptoms and hypoxia.   -s/p Zosyn IV x1, Vancomycin IV x 1 in ED  -contine zosyn  - COVID negative  - D-dimer, ferritin, CRP added to lab draw, f/u  - f/u blood cultures, urine cultures, procal, legionella, strep pneumo antigen urine  - ID consulted, f/u recs  - Pulpamela Espinosa consulted, f/u recs Acute hypoxic respiratory failure likely 2/2 aspiration PNA likely 2/2 aspiration PNA given right side pleural effusion. Patient was seen in [facility] ED 4 days ago, covid pcr negative at that time. Now returning with worsening symptoms and hypoxia.   -s/p Zosyn IV x1, Vancomycin IV x 1 in ED  -contine zosyn  - COVID negative  - D-dimer, ferritin, CRP added to lab draw, f/u  - f/u blood cultures, urine cultures, procal, legionella, strep pneumo antigen urine  - ID, Dr. JOE Palomino consulted, recs appreciated  - Abdirahman Espinosa consulted, recs appreciated.

## 2020-12-18 NOTE — H&P ADULT - NSHPREVIEWOFSYSTEMS_GEN_ALL_CORE
CONSTITUTIONAL: No weakness, fevers or chills  RESPIRATORY: No cough, wheezing, + hemoptysis; + shortness of breath  CARDIOVASCULAR: No chest pain, + palpitations  GASTROINTESTINAL: No abdominal or epigastric pain. No nausea, vomiting, or hematemesis; No diarrhea or constipation. No melena or hematochezia.  GENITOURINARY: No dysuria, frequency or hematuria  NEUROLOGICAL: No numbness or weakness  SKIN: No itching, burning, rashes, or lesions   All other review of systems is negative unless indicated above.

## 2020-12-18 NOTE — CONSULT NOTE ADULT - ASSESSMENT
64 y/o M w/ a HTN, HLD, myasthenia gravis, and an "arrythmia" admitted w/:    1. Acute hypoxic respiratory failure  2. Lobar PNA  3. Decompensated heart failure  4. Pulmonary edema  5. A fib w/ RVR    PLAN:  - Mental status intact.  - Continue w/ cardizem infusion for rate control. Lopressor 25mg q6 hrs and Lasix 40mg BID added. May consider addition of oral CCB to assist w/ transition off Cardizem infusion. Continue w/ statin. Troponin negative, EKG w/o ischemic changes. Obtain TTE.  - BiPAP initiated due to increased work of breathing. Actively titrating FiO2 to maintain SpO2 > 92%. Will wean as tolerated. CTA chest negative for pulmonary embolism.  - NPO while on BiPAP.  - Renal function within normal limits. Monitor urine output while receiving diuretic therapy. Replace electrolytes as needed.  - Afebrile, no leukocytosis. CT chest concerning for PNA. COVID-19 PCR negative x2, RVP negative. F/u sputum culture, MRSA PCR, and urine legionella. S/p Vanco x 1 dose. Continue broad spectrum abx coverage w/ Zosyn. ID following.  - F/u HgbA1C and TSH in AM.  - Full dose AC w/ Lovenox, switch to DOAC prior to discharge. Mechanical DVT prophylaxis w/ ICDs.    Dispo: Full code.    Case discussed w/ ICU attending, Dr. Manriquez.

## 2020-12-18 NOTE — CONSULT NOTE ADULT - ASSESSMENT
The patient is a 65 M history of Myasthenia gravis, HTN, left foot drop presents with shortness of breath. Patient was recently seen in [facility] ED 4 days ago for SOB, myalgias and cough. Covid test was negative. Now in Aib    # A.Fib: Likely 2/2 to CAP. Possible CHF excerebration also contributing.  - Patient is in New onset A.fib   - EKG shows A.Fib. No signs of acute ischemic changes.   - Trops negative  - 's -140's  - Patient is S/P Cardizem 10mg IV push x2 and was started on Cardizem infusion, initially 5ml/h and later titrated to 10 and then 15ml/h with no break in A.Fib in the ED  - Recommend starting on Metoprolol tartrate 25mg 1 po q 6 hour with hold perimeters, continue Cardizem infusion at 15ml/hour  - Start Lasix 40mg q12 hours  - Continue Lovenox full dose at 80 mg.   - Hold home amiodarone, metoprolol and Triamterene/ HTCZ  for now.   - Admit with tele monitoring     # CHF: Likely 2/2 to valvular heart disease and exacerbated by PNA  - Recommend TTE  - BNP 4512-->3010. Trend BNP  - strict I&O  - daily weight  - Consider getting CT scan of Chest to further evaluate lung process, PNA and possible CHF excerebration     # HTN  - monitor routine hemodynamics.  - management as above.  - Other cardiovascular workup will depend on clinical course. All other workup per primary team.  - Will follow.           The patient is a 65 M history of Myasthenia gravis, HTN, left foot drop presents with shortness of breath. Patient was recently seen in [facility] ED 4 days ago for SOB, myalgias and cough. Covid test was negative. Now in Aib    # A.Fib: Likely 2/2 to CAP. Possible CHF exacerbation also contributing.  - Patient is in New onset A.fib   - EKG shows A.Fib. No signs of acute ischemic changes.   - Trops negative  - 's -140's  - Patient is S/P Cardizem 10mg IV push x2 and was started on Cardizem infusion, initially 5ml/h and later titrated to 10 and then 15ml/h with no break in A.Fib in the ED  - Recommend starting on Metoprolol tartrate 25mg 1 po q 6 hour with hold perimeters, continue Cardizem infusion at 15ml/hour  - Start Lasix 40mg q12 hours  - Continue Lovenox full dose at 80 mg.   - Hold home amlodipine, metoprolol and Triamterene/ HTCZ  for now.   - Admit with tele monitoring     # CHF: Likely 2/2 to valvular heart disease and exacerbated by PNA  - Recommend TTE  - BNP 4512-->3010. Trend BNP  - strict I&O  - daily weight  - Consider getting CT scan of Chest to further evaluate lung process, PNA and possible CHF excerebration     # HTN  - monitor routine hemodynamics.  - management as above.  - Other cardiovascular workup will depend on clinical course. All other workup per primary team.  - Will follow.

## 2020-12-18 NOTE — H&P ADULT - NSICDXPASTMEDICALHX_GEN_ALL_CORE_FT
PAST MEDICAL HISTORY:  History of left foot drop     HTN (hypertension)     Murmur, cardiac     Myasthenia gravis

## 2020-12-18 NOTE — H&P ADULT - PROBLEM SELECTOR PLAN 1
Acute hypoxic respiratory failure   likely 2/2 CAP. Patient was seen in [facility] ED 4 days ago, covid pcr negative at that time. Now returning with worsening symptoms and hypoxia.   - COVID pending  - D-dimer, ferritin, CRP added to lab draw, f/u  - f/u blood cultures, urine cultures, procal, legionella, strep pneumo antigen urine, RVP  - will start rocephin and zithro  - Acute hypoxic respiratory failure   likely 2/2 CAP. Patient was seen in [facility] ED 4 days ago, covid pcr negative at that time. Now returning with worsening symptoms and hypoxia.   - s/p Zosyn IV x1, Vancomycin IV x 1 in ED  - COVID pending  - D-dimer, ferritin, CRP added to lab draw, f/u  - f/u blood cultures, urine cultures, procal, legionella, strep pneumo antigen urine, RVP  - will start rocephin and zithro  - Acute hypoxic respiratory failure   likely 2/2 CAP. Patient was seen in [facility] ED 4 days ago, covid pcr negative at that time. Now returning with worsening symptoms and hypoxia.   - s/p Zosyn IV x1, Vancomycin IV x 1 in ED  - COVID pending  - D-dimer, ferritin, CRP added to lab draw, f/u  - f/u blood cultures, urine cultures, procal, legionella, strep pneumo antigen urine, RVP  - will continue zosyn for now.  - ID consulted, f/u recs  - Abdirahman Espinosa consulted, f/u recs Acute hypoxic respiratory failure likely 2/2 aspiration PNA likely 2/2 aspiration PNA given right side pleural effusion. Patient was seen in [facility] 4 days ago, covid pcr negative at that time. Now returning with worsening symptoms and hypoxia.   - s/p Zosyn IV x1, Vancomycin IV x 1 in ED  - COVID pending  - D-dimer, ferritin, CRP added to lab draw, f/u  - f/u blood cultures, urine cultures, procal, legionella, strep pneumo antigen urine  - will continue zosyn for now.  - ID consulted, f/u recs  - Abdirahman Espinosa consulted, f/u recs

## 2020-12-18 NOTE — DISCHARGE NOTE PROVIDER - HOSPITAL COURSE
FROM ADMISSION H+P:   HPI:  65 M history of Myasthenia gravis, HTN, left foot drop presents with shortness of breath. Patient was recently seen in [facility] ED 4 days ago for SOB, myalgias and cough. Covid test was nebative. He was diagnosed with community aquired PNA and rxed 10 day course of amoxcillin-clavulanate. Covid test was negative. He completed 4 days worth of antibiotics which initially helped with his myalgias and chills but he is now noting worsening of his dyspnea. He is also coughing up pink tinged sputum and notes an increase awareness of his heart rate. He denies any chest pain, neck pain, arm pain, diaphoresis, abdominal pain . He endorses orthopnea since sunday. No history of heart failure. Sees cardiologist Dr. Cirilo Najera for HTN and HLD. Nuclear stress test done 5 years ago was normal. He admits to eating stir luis food often.        In the ED  VS: Hr 140, /77, RR 32 SpO2 89% on RA, T 98.7  Labs: WBC 8.97, HH 13.1/37.4, PLTS 194, Na 136, K 3.7, Cr .80, Gluc 121, Lactate 1.8  Chest Xray: (my read) right pleural effusion worsened when compared to 12/14  EKG: Afib w/ RVR at 147 bpm  Given in ED: Cardizem 10mg IVP x3, Zosyn IV x1, Vancomycin IV x 1, NS 1L bolus x1         (18 Dec 2020 02:49)      ---  HOSPITAL COURSE: Pt admitted and started on Zosyn IV intermittent q 8 hrs for persistent pna. Pt also found to be in new onset afib with HR 130s-140s. Cardiology, Dr Gillis's group consulted. Pt had received cardizem IVP 10 mg x 3 and been started on diltiazem drip; titrated up to 20 cc/hr with some improvement in heart rate to 120s. Home antihypertensives were held for systolic Blood pressures <110 mmHg. Decision was made per hospital leadership, with patients' agreement to transfer patient to another [facility] in setting of pandemic related high hospital capacity and patient's continued need for acute care.    Pt evaluated prior to discharge and is stable for transfer.    ---    Physical exam on day of discharge:    T(C): 36.8 (12-18-20 @ 05:30), Max: 37.1 (12-18-20 @ 00:31)  HR: 123 (12-18-20 @ 08:54) (120 - 144)  BP: 105/61 (12-18-20 @ 08:54) (90/67 - 120/73)  RR: 20 (12-18-20 @ 08:54) (18 - 32)  SpO2: 94% (12-18-20 @ 08:54) (89% - 96%)    GENERAL: patient appears well, no acute distress, appropriately interactive  EYES: sclera clear, no exudates, PERRL  LUNGS: diminished breath sounds over R midlung region and R lung base, +crackles right lung base  HEART: soft S1/S2, tachy rate and irregular rhythm, no murmurs noted, no lower extremity edema  GASTROINTESTINAL: abdomen is soft, nontender, nondistended, normoactive bowel sounds  INTEGUMENT: good skin turgor, warm skin, appears well perfused  MUSCULOSKELETAL: no clubbing or cyanosis, no obvious deformity  NEUROLOGIC: awake, alert, oriented x3, good muscle tone in all 4 extremities  HEME/LYMPH: no obvious ecchymosis or petechiae    ---  CONSULTANTS:  Cardiology: [facility]  Pulmonology: Dr Espinosa   FROM ADMISSION H+P:   HPI:  65 M history of Myasthenia gravis, HTN, left foot drop presents with shortness of breath. Patient was recently seen in [facility] ED 4 days ago for SOB, myalgias and cough. Covid test was nebative. He was diagnosed with community aquired PNA and rxed 10 day course of amoxcillin-clavulanate. Covid test was negative. He completed 4 days worth of antibiotics which initially helped with his myalgias and chills but he is now noting worsening of his dyspnea. He is also coughing up pink tinged sputum and notes an increase awareness of his heart rate. He denies any chest pain, neck pain, arm pain, diaphoresis, abdominal pain . He endorses orthopnea since sunday. No history of heart failure. Sees cardiologist Dr. Cirilo Najera for HTN and HLD. Nuclear stress test done 5 years ago was normal. He admits to eating stir luis food often.        In the ED  VS: Hr 140, /77, RR 32 SpO2 89% on RA, T 98.7  Labs: WBC 8.97, HH 13.1/37.4, PLTS 194, Na 136, K 3.7, Cr .80, Gluc 121, Lactate 1.8  Chest Xray: (my read) right pleural effusion worsened when compared to 12/14  EKG: Afib w/ RVR at 147 bpm  Given in ED: Cardizem 10mg IVP x3, Zosyn IV x1, Vancomycin IV x 1, NS 1L bolus x1         (18 Dec 2020 02:49)      ---  HOSPITAL COURSE: Pt admitted and started on Zosyn IV intermittent q 8 hrs for persistent pna. Pt also found to be in new onset afib with HR 130s-140s. Cardiology, Dr Gillis's group consulted. Pt had received cardizem IVP 10 mg x 3 and been started on diltiazem drip; titrated up to 20 cc/hr with some improvement in heart rate to 120s. Pt then received Lopressor 5mg IV x1. Home antihypertensives were held for systolic Blood pressures <110 mmHg. Decision was made per hospital leadership, with patients' agreement to transfer patient to another [facility] facility in setting of pandemic related high hospital capacity and patient's continued need for acute care.    Pt evaluated prior to discharge and is stable for transfer.    ---    Physical exam on day of discharge:    T(C): 36.8 (12-18-20 @ 05:30), Max: 37.1 (12-18-20 @ 00:31)  HR: 123 (12-18-20 @ 08:54) (120 - 144)  BP: 105/61 (12-18-20 @ 08:54) (90/67 - 120/73)  RR: 20 (12-18-20 @ 08:54) (18 - 32)  SpO2: 94% (12-18-20 @ 08:54) (89% - 96%)    GENERAL: patient appears well, no acute distress, appropriately interactive  EYES: sclera clear, no exudates, PERRL  LUNGS: diminished breath sounds over R midlung region and R lung base, +crackles right lung base  HEART: soft S1/S2, tachy rate and irregular rhythm, no murmurs noted, no lower extremity edema  GASTROINTESTINAL: abdomen is soft, nontender, nondistended, normoactive bowel sounds  INTEGUMENT: good skin turgor, warm skin, appears well perfused  MUSCULOSKELETAL: no clubbing or cyanosis, no obvious deformity  NEUROLOGIC: awake, alert, oriented x3, good muscle tone in all 4 extremities  HEME/LYMPH: no obvious ecchymosis or petechiae    ---  CONSULTANTS:  Cardiology: [facility]  Pulmonology: Dr Espinosa   FROM ADMISSION H+P:   HPI:  65 M history of Myasthenia gravis, HTN, left foot drop presents with shortness of breath. Patient was recently seen in [facility] ED 4 days ago for SOB, myalgias and cough. Covid test was nebative. He was diagnosed with community aquired PNA and rxed 10 day course of amoxcillin-clavulanate. Covid test was negative. He completed 4 days worth of antibiotics which initially helped with his myalgias and chills but he is now noting worsening of his dyspnea. He is also coughing up pink tinged sputum and notes an increase awareness of his heart rate. He denies any chest pain, neck pain, arm pain, diaphoresis, abdominal pain . He endorses orthopnea since sunday. No history of heart failure. Sees cardiologist Dr. Cirilo Najera for HTN and HLD. Nuclear stress test done 5 years ago was normal. He admits to eating stir luis food often.        In the ED  VS: Hr 140, /77, RR 32 SpO2 89% on RA, T 98.7  Labs: WBC 8.97, HH 13.1/37.4, PLTS 194, Na 136, K 3.7, Cr .80, Gluc 121, Lactate 1.8  Chest Xray: (my read) right pleural effusion worsened when compared to 12/14  EKG: Afib w/ RVR at 147 bpm  Given in ED: Cardizem 10mg IVP x3, Zosyn IV x1, Vancomycin IV x 1, NS 1L bolus x1         (18 Dec 2020 02:49)      ---  HOSPITAL COURSE: Pt admitted and started on Zosyn IV intermittent q 8 hrs for persistent pna. Pt also found to be in new onset afib with HR 130s-140s. Cardiology, Dr Gillis's group consulted. Pt had received cardizem IVP 10 mg x 3 and been started on diltiazem drip; titrated up to 20 cc/hr with some improvement in heart rate to 120s. Pt then received Lopressor 5mg IV x1. PT was started on full dose anticoagulatio nwith lovenox 80 mg subQ BID. Home antihypertensives were initially held for systolic Blood pressures <110 mmHg. Decision was made per hospital leadership, with patients' agreement to transfer patient to another [facility] facility in setting of pandemic related high hospital capacity and patient's continued need for acute care.    Pt evaluated prior to discharge and is stable for transfer.    ---    Physical exam on day of discharge:    T(C): 36.8 (12-18-20 @ 05:30), Max: 37.1 (12-18-20 @ 00:31)  HR: 123 (12-18-20 @ 08:54) (120 - 144)  BP: 105/61 (12-18-20 @ 08:54) (90/67 - 120/73)  RR: 20 (12-18-20 @ 08:54) (18 - 32)  SpO2: 94% (12-18-20 @ 08:54) (89% - 96%)    GENERAL: patient appears well, no acute distress, appropriately interactive  EYES: sclera clear, no exudates, PERRL  LUNGS: diminished breath sounds over R midlung region and R lung base, +crackles right lung base  HEART: soft S1/S2, tachy rate and irregular rhythm, no murmurs noted, no lower extremity edema  GASTROINTESTINAL: abdomen is soft, nontender, nondistended, normoactive bowel sounds  INTEGUMENT: good skin turgor, warm skin, appears well perfused  MUSCULOSKELETAL: no clubbing or cyanosis, no obvious deformity  NEUROLOGIC: awake, alert, oriented x3, good muscle tone in all 4 extremities  HEME/LYMPH: no obvious ecchymosis or petechiae    ---  CONSULTANTS:  Cardiology: [facility]  Pulmonology: Dr Espinosa   FROM ADMISSION H+P:   HPI:  65 M history of Myasthenia gravis, HTN, left foot drop presents with shortness of breath. Patient was recently seen in [facility] ED 4 days ago for SOB, myalgias and cough. Covid test was nebative. He was diagnosed with community aquired PNA and rxed 10 day course of amoxcillin-clavulanate. Covid test was negative. He completed 4 days worth of antibiotics which initially helped with his myalgias and chills but he is now noting worsening of his dyspnea. He is also coughing up pink tinged sputum and notes an increase awareness of his heart rate. He denies any chest pain, neck pain, arm pain, diaphoresis, abdominal pain . He endorses orthopnea since sunday. No history of heart failure. Sees cardiologist Dr. Cirilo Najera for HTN and HLD. Nuclear stress test done 5 years ago was normal. He admits to eating stir luis food often.        In the ED  VS: Hr 140, /77, RR 32 SpO2 89% on RA, T 98.7  Labs: WBC 8.97, HH 13.1/37.4, PLTS 194, Na 136, K 3.7, Cr .80, Gluc 121, Lactate 1.8  Chest Xray: (my read) right pleural effusion worsened when compared to 12/14  EKG: Afib w/ RVR at 147 bpm  Given in ED: Cardizem 10mg IVP x3, Zosyn IV x1, Vancomycin IV x 1, NS 1L bolus x1         (18 Dec 2020 02:49)      ---  HOSPITAL COURSE: Pt admitted and started on Zosyn IV intermittent q 8 hrs for persistent pna. Pt also found to be in new onset afib with HR 130s-140s. Cardiology, Dr Gillis's group consulted. Pt had received cardizem IVP 10 mg x 3 and been started on diltiazem drip; titrated up to 20 cc/hr with some improvement in heart rate to 120s. Pt then received Lopressor 5mg IV x1. PT was started on full dose anticoagulatio nwith lovenox 80 mg subQ BID. Home antihypertensives were initially held for systolic Blood pressures <110 mmHg. Decision was made per hospital leadership, with patients' agreement to transfer patient to another [facility] facility in setting of pandemic related high hospital capacity and patient's continued need for acute care.    Pt evaluated prior to discharge and is stable for transfer.    ---    Physical exam on day of discharge:    T(C): 36.8 (12-18-20 @ 05:30), Max: 37.1 (12-18-20 @ 00:31)  HR: 123 (12-18-20 @ 08:54) (120 - 144)  BP: 105/61 (12-18-20 @ 08:54) (90/67 - 120/73)  RR: 20 (12-18-20 @ 08:54) (18 - 32)  SpO2: 94% (12-18-20 @ 08:54) (89% - 96%)    GENERAL: patient appears well, no acute distress, appropriately interactive  EYES: sclera clear, no exudates, PERRL  LUNGS: diminished breath sounds over R midlung region and R lung base, +crackles right lung base  HEART: soft S1/S2, tachy rate and irregular rhythm, + murmur, no lower extremity edema  GASTROINTESTINAL: abdomen is soft, nontender, nondistended, normoactive bowel sounds  INTEGUMENT: good skin turgor, warm skin, appears well perfused  MUSCULOSKELETAL: no clubbing or cyanosis, no obvious deformity  NEUROLOGIC: awake, alert, oriented x3, good muscle tone in all 4 extremities  HEME/LYMPH: no obvious ecchymosis or petechiae    ---  CONSULTANTS:  Cardiology: [facility]  Pulmonology: Dr Espinosa   FROM ADMISSION H+P:   HPI:  65 M history of Myasthenia gravis, HTN, left foot drop presents with shortness of breath. Patient was recently seen in [facility] ED 4 days ago for SOB, myalgias and cough. Covid test was nebative. He was diagnosed with community aquired PNA and rxed 10 day course of amoxcillin-clavulanate. Covid test was negative. He completed 4 days worth of antibiotics which initially helped with his myalgias and chills but he is now noting worsening of his dyspnea. He is also coughing up pink tinged sputum and notes an increase awareness of his heart rate. He denies any chest pain, neck pain, arm pain, diaphoresis, abdominal pain . He endorses orthopnea since sunday. No history of heart failure. Sees cardiologist Dr. Cirilo Najera for HTN and HLD. Nuclear stress test done 5 years ago was normal. He admits to eating stir luis food often.        In the ED  VS: Hr 140, /77, RR 32 SpO2 89% on RA, T 98.7  Labs: WBC 8.97, HH 13.1/37.4, PLTS 194, Na 136, K 3.7, Cr .80, Gluc 121, Lactate 1.8  Chest Xray: (my read) right pleural effusion worsened when compared to 12/14  EKG: Afib w/ RVR at 147 bpm  Given in ED: Cardizem 10mg IVP x3, Zosyn IV x1, Vancomycin IV x 1, NS 1L bolus x1         (18 Dec 2020 02:49)      ---  HOSPITAL COURSE: Pt admitted and started on Zosyn IV intermittent q 8 hrs for persistent pna. Pt also found to be in new onset afib with HR 130s-140s. Cardiology, Dr Gillis's group consulted. Pt had received cardizem IVP 10 mg x 3 and had been started on diltiazem drip; titrated up to 20 cc/hr with some improvement in heart rate to 120s. Pt then received Lopressor 5mg IV x1. Pt was started on full dose anticoagulation with lovenox 80 mg subQ BID. Home antihypertensives were initially held for systolic Blood pressures <110 mmHg. Lasix 40 mg IV BID started for diuresis in setting of pleural effusion and Metoprolol tartrate 25 mg BID was started for further rate control. Pt transferred to ICU on 12/18 for acute hypoxic respiratory failure 2/2 aspiration pna. Respiratory status improved on BiPAP. Lasix continued. For Afib, pt continued dilt gtt, lovenox AC. IV antibiotics continued. Sputum culture_________.    On day of discharge__________    ---    Physical exam on day of discharge:    T(C): 36.8 (12-18-20 @ 05:30), Max: 37.1 (12-18-20 @ 00:31)  HR: 123 (12-18-20 @ 08:54) (120 - 144)  BP: 105/61 (12-18-20 @ 08:54) (90/67 - 120/73)  RR: 20 (12-18-20 @ 08:54) (18 - 32)  SpO2: 94% (12-18-20 @ 08:54) (89% - 96%)    GENERAL: patient appears well, no acute distress, appropriately interactive  EYES: sclera clear, no exudates, PERRL  LUNGS: diminished breath sounds over R midlung region and R lung base, +crackles right lung base  HEART: soft S1/S2, tachy rate and irregular rhythm, + murmur, no lower extremity edema  GASTROINTESTINAL: abdomen is soft, nontender, nondistended, normoactive bowel sounds  INTEGUMENT: good skin turgor, warm skin, appears well perfused  MUSCULOSKELETAL: no clubbing or cyanosis, no obvious deformity  NEUROLOGIC: awake, alert, oriented x3, good muscle tone in all 4 extremities  HEME/LYMPH: no obvious ecchymosis or petechiae    ---  CONSULTANTS:  Cardiology: [facility]  Pulmonology: Dr Espinosa   FROM ADMISSION H+P:   HPI:  65 M history of Myasthenia gravis, HTN, left foot drop presents with shortness of breath. Patient was recently seen in [facility]\"" ED 4 days ago for SOB, myalgias and cough. Covid test was nebative. He was diagnosed with community aquired PNA and rxed 10 day course of amoxcillin-clavulanate. Covid test was negative. He completed 4 days worth of antibiotics which initially helped with his myalgias and chills but he is now noting worsening of his dyspnea. He is also coughing up pink tinged sputum and notes an increase awareness of his heart rate. He denies any chest pain, neck pain, arm pain, diaphoresis, abdominal pain . He endorses orthopnea since sunday. No history of heart failure. Sees cardiologist Dr. Cirilo Najera for HTN and HLD. Nuclear stress test done 5 years ago was normal. He admits to eating stir luis food often.        In the ED  VS: Hr 140, /77, RR 32 SpO2 89% on RA, T 98.7  Labs: WBC 8.97, HH 13.1/37.4, PLTS 194, Na 136, K 3.7, Cr .80, Gluc 121, Lactate 1.8  Chest Xray: (my read) right pleural effusion worsened when compared to 12/14  EKG: Afib w/ RVR at 147 bpm  Given in ED: Cardizem 10mg IVP x3, Zosyn IV x1, Vancomycin IV x 1, NS 1L bolus x1         (18 Dec 2020 02:49)      ---  HOSPITAL COURSE: Pt admitted and started on Zosyn IV intermittent q 8 hrs for persistent pna. Pt also found to be in new onset afib with HR 130s-140s. Cardiology, Dr Gillis's group consulted. Pt had received cardizem IVP 10 mg x 3 and had been started on diltiazem drip; titrated up to 20 cc/hr with some improvement in heart rate to 120s. Pt then received Lopressor 5mg IV x1. Pt was started on full dose anticoagulation with lovenox 80 mg subQ BID. Home antihypertensives were initially held for systolic Blood pressures <110 mmHg. Lasix 40 mg IV BID started for diuresis in setting of pleural effusion and Metoprolol tartrate 25 mg BID was started for further rate control. Pt transferred to ICU on 12/18 for acute hypoxic respiratory failure 2/2 aspiration pna. Respiratory status improved on BiPAP. Lasix continued. For Afib, pt continued dilt gtt, lovenox AC. IV antibiotics continued. TTE performed showing severe MR which was a significant change from prior reported TTE results from outpatient cardiologist with mild MR. Patient unable to be weaned off BiPAP. Transfer to [place] accepted on 12/20/2020.      ---    Physical exam on day of discharge:    T(C): 36.8 (12-18-20 @ 05:30), Max: 37.1 (12-18-20 @ 00:31)  HR: 123 (12-18-20 @ 08:54) (120 - 144)  BP: 105/61 (12-18-20 @ 08:54) (90/67 - 120/73)  RR: 20 (12-18-20 @ 08:54) (18 - 32)  SpO2: 94% (12-18-20 @ 08:54) (89% - 96%)    GENERAL: patient appears well, no acute distress, appropriately interactive, on BiPAP  EYES: sclera clear, no exudates, PERRL  LUNGS: diminished breath sounds over R midlung region and R lung base, +crackles right lung base, mild crackles in left lung base  HEART: soft S1/S2, irregularly irregular rhythm, + murmur, no lower extremity edema  GASTROINTESTINAL: abdomen is soft, nontender, nondistended, normoactive bowel sounds  INTEGUMENT: good skin turgor, warm skin, appears well perfused  MUSCULOSKELETAL: no clubbing or cyanosis, no obvious deformity  NEUROLOGIC: awake, alert, oriented x3, good muscle tone in all 4 extremities  HEME/LYMPH: no obvious ecchymosis or petechiae    ---  CONSULTANTS:  Cardiology: [facility]  Pulmonology: Dr Espinosa  ID: Dr. Av Palomino

## 2020-12-18 NOTE — H&P ADULT - PROBLEM SELECTOR PLAN 3
Chronic  BP controlled here.  - continue home Chronic  - BP is on softer side now s/p cardizem 10mg IVP x3.  - will hold home amlodipine, metoprolol for now  - started on cardizem gtt at 5mg/hr as above  - routine HD monitoring Chronic  - BP is on softer side now s/p cardizem 10mg IVP x3.  - will hold home amlodipine, metoprolol for now  - started on cardizem gtt at 5mg/hr will titrate to 10mg/hr if needed.  - routine HD monitoring

## 2020-12-19 DIAGNOSIS — J90 PLEURAL EFFUSION, NOT ELSEWHERE CLASSIFIED: ICD-10-CM

## 2020-12-19 LAB
A1C WITH ESTIMATED AVERAGE GLUCOSE RESULT: 4.9 % — SIGNIFICANT CHANGE UP (ref 4–5.6)
ANION GAP SERPL CALC-SCNC: 10 MMOL/L — SIGNIFICANT CHANGE UP (ref 5–17)
BUN SERPL-MCNC: 18 MG/DL — SIGNIFICANT CHANGE UP (ref 7–23)
CALCIUM SERPL-MCNC: 8.2 MG/DL — LOW (ref 8.5–10.1)
CHLORIDE SERPL-SCNC: 97 MMOL/L — SIGNIFICANT CHANGE UP (ref 96–108)
CO2 SERPL-SCNC: 28 MMOL/L — SIGNIFICANT CHANGE UP (ref 22–31)
CREAT SERPL-MCNC: 0.91 MG/DL — SIGNIFICANT CHANGE UP (ref 0.5–1.3)
CULTURE RESULTS: SIGNIFICANT CHANGE UP
CULTURE RESULTS: SIGNIFICANT CHANGE UP
ESTIMATED AVERAGE GLUCOSE: 94 MG/DL — SIGNIFICANT CHANGE UP (ref 68–114)
GLUCOSE SERPL-MCNC: 113 MG/DL — HIGH (ref 70–99)
HCT VFR BLD CALC: 37.9 % — LOW (ref 39–50)
HCV AB S/CO SERPL IA: 0.06 S/CO — SIGNIFICANT CHANGE UP (ref 0–0.99)
HCV AB SERPL-IMP: SIGNIFICANT CHANGE UP
HGB BLD-MCNC: 13 G/DL — SIGNIFICANT CHANGE UP (ref 13–17)
MAGNESIUM SERPL-MCNC: 2.4 MG/DL — SIGNIFICANT CHANGE UP (ref 1.6–2.6)
MCHC RBC-ENTMCNC: 29.1 PG — SIGNIFICANT CHANGE UP (ref 27–34)
MCHC RBC-ENTMCNC: 34.3 GM/DL — SIGNIFICANT CHANGE UP (ref 32–36)
MCV RBC AUTO: 85 FL — SIGNIFICANT CHANGE UP (ref 80–100)
NRBC # BLD: 0 /100 WBCS — SIGNIFICANT CHANGE UP (ref 0–0)
PHOSPHATE SERPL-MCNC: 3.3 MG/DL — SIGNIFICANT CHANGE UP (ref 2.5–4.5)
PLATELET # BLD AUTO: 246 K/UL — SIGNIFICANT CHANGE UP (ref 150–400)
POTASSIUM SERPL-MCNC: 4 MMOL/L — SIGNIFICANT CHANGE UP (ref 3.5–5.3)
POTASSIUM SERPL-SCNC: 4 MMOL/L — SIGNIFICANT CHANGE UP (ref 3.5–5.3)
RBC # BLD: 4.46 M/UL — SIGNIFICANT CHANGE UP (ref 4.2–5.8)
RBC # FLD: 13.2 % — SIGNIFICANT CHANGE UP (ref 10.3–14.5)
SODIUM SERPL-SCNC: 135 MMOL/L — SIGNIFICANT CHANGE UP (ref 135–145)
SPECIMEN SOURCE: SIGNIFICANT CHANGE UP
SPECIMEN SOURCE: SIGNIFICANT CHANGE UP
TSH SERPL-MCNC: 0.3 UIU/ML — LOW (ref 0.36–3.74)
TSH SERPL-MCNC: 0.32 UIU/ML — LOW (ref 0.36–3.74)
WBC # BLD: 12.48 K/UL — HIGH (ref 3.8–10.5)
WBC # FLD AUTO: 12.48 K/UL — HIGH (ref 3.8–10.5)

## 2020-12-19 PROCEDURE — 99291 CRITICAL CARE FIRST HOUR: CPT

## 2020-12-19 PROCEDURE — 93306 TTE W/DOPPLER COMPLETE: CPT | Mod: 26

## 2020-12-19 RX ORDER — MAGNESIUM SULFATE 500 MG/ML
2 VIAL (ML) INJECTION ONCE
Refills: 0 | Status: COMPLETED | OUTPATIENT
Start: 2020-12-19 | End: 2020-12-19

## 2020-12-19 RX ADMIN — PIPERACILLIN AND TAZOBACTAM 25 GRAM(S): 4; .5 INJECTION, POWDER, LYOPHILIZED, FOR SOLUTION INTRAVENOUS at 15:13

## 2020-12-19 RX ADMIN — Medication 15 MG/HR: at 00:33

## 2020-12-19 RX ADMIN — Medication 50 GRAM(S): at 01:05

## 2020-12-19 RX ADMIN — CHLORHEXIDINE GLUCONATE 1 APPLICATION(S): 213 SOLUTION TOPICAL at 05:16

## 2020-12-19 RX ADMIN — PIPERACILLIN AND TAZOBACTAM 25 GRAM(S): 4; .5 INJECTION, POWDER, LYOPHILIZED, FOR SOLUTION INTRAVENOUS at 21:49

## 2020-12-19 RX ADMIN — Medication 15 MG/HR: at 06:41

## 2020-12-19 RX ADMIN — ATORVASTATIN CALCIUM 40 MILLIGRAM(S): 80 TABLET, FILM COATED ORAL at 21:48

## 2020-12-19 RX ADMIN — Medication 25 MILLIGRAM(S): at 13:10

## 2020-12-19 RX ADMIN — Medication 40 MILLIGRAM(S): at 19:22

## 2020-12-19 RX ADMIN — ENOXAPARIN SODIUM 80 MILLIGRAM(S): 100 INJECTION SUBCUTANEOUS at 19:22

## 2020-12-19 RX ADMIN — Medication 25 MILLIGRAM(S): at 06:06

## 2020-12-19 RX ADMIN — Medication 40 MILLIGRAM(S): at 06:06

## 2020-12-19 RX ADMIN — PIPERACILLIN AND TAZOBACTAM 25 GRAM(S): 4; .5 INJECTION, POWDER, LYOPHILIZED, FOR SOLUTION INTRAVENOUS at 06:07

## 2020-12-19 NOTE — PROGRESS NOTE ADULT - PROBLEM SELECTOR PLAN 1
Acute hypoxic respiratory failure likely 2/2 aspiration PNA likely 2/2 aspiration PNA given right side pleural effusion. Patient was seen in [facility] ED 4 days ago, covid pcr negative at that time. Now returning with worsening symptoms and hypoxia.   -s/p Zosyn IV x1, Vancomycin IV x 1 in ED  -contine zosyn  - COVID negative  - D-dimer, ferritin, CRP added to lab draw, f/u  - f/u blood cultures, urine cultures, procal, legionella, strep pneumo antigen urine  - ID, Dr. JOE Palomino consulted, recs appreciated  - Abdirahman Espinosa consulted, recs appreciated. Acute hypoxic respiratory failure likely 2/2 aspiration PNA likely 2/2 aspiration PNA given right side pleural effusion. Patient was seen in [facility] ED 4 days ago, covid pcr negative at that time. Now returning with worsening symptoms and hypoxia.   -s/p Zosyn IV x1, Vancomycin IV x 1 in ED  -contine zosyn  - COVID negative  -f/u sputum cx  - ID, Dr. JOE Palomino consulted, recs appreciated  - Pulpamela Espinosa consulted, recs appreciated.

## 2020-12-19 NOTE — PROGRESS NOTE ADULT - SUBJECTIVE AND OBJECTIVE BOX
Patient is a 65y old  Male who presents with a chief complaint of Aspiration PNA (18 Dec 2020 16:27)      BRIEF HOSPITAL COURSE:   65M with PMHx HTN, HLD, myasthenia gravis who presented to ED with SOB. Being treated as outpt for PNA. Worsening SOB and new onset pink sputum. Found to have CT chest with bilateral infiltrates, pleural effusions. New onset afib with RVR. Required placement on NIPPV due to work of breathing with addition of cardizem infusion for rate control. Transferred to ICU.    Events last 24 hours: Remains on cardizem infusion, afib 115 with intermittent 130s, received on 70% NIPPV, trialed to FM with increased WOB and placement back on NIPPV, received lasix earlier. Denies CP, abd pain, HA, fever, back pain, N/V. States he feels more comfortable on NIPPV.    PAST MEDICAL & SURGICAL HISTORY:  History of left foot drop    Murmur, cardiac    Myasthenia gravis    HTN (hypertension)    History of back surgery        Review of Systems:  12pt ROS negative unless otherwise stated above    Medications:  piperacillin/tazobactam IVPB.. 3.375 Gram(s) IV Intermittent every 8 hours    diltiazem Infusion 15 mG/Hr IV Continuous <Continuous>  furosemide   Injectable 40 milliGRAM(s) IV Push two times a day  metoprolol tartrate 25 milliGRAM(s) Oral every 6 hours          enoxaparin Injectable 80 milliGRAM(s) SubCutaneous two times a day        atorvastatin 40 milliGRAM(s) Oral at bedtime        chlorhexidine 2% Cloths 1 Application(s) Topical <User Schedule>            ICU Vital Signs Last 24 Hrs  T(C): 37.1 (18 Dec 2020 18:00), Max: 37.1 (18 Dec 2020 18:00)  T(F): 98.7 (18 Dec 2020 18:00), Max: 98.7 (18 Dec 2020 18:00)  HR: 87 (19 Dec 2020 00:00) (81 - 144)  BP: 95/61 (19 Dec 2020 00:00) (90/67 - 126/77)  BP(mean): 73 (19 Dec 2020 00:00) (73 - 99)  ABP: --  ABP(mean): --  RR: 35 (19 Dec 2020 00:00) (18 - 43)  SpO2: 96% (19 Dec 2020 00:00) (85% - 99%)                LABS:                        13.3   9.54  )-----------( 266      ( 18 Dec 2020 07:14 )             38.2     12-18    136  |  100  |  11  ----------------------------<  112<H>  3.8   |  24  |  0.81    Ca    8.5      18 Dec 2020 07:14    TPro  6.5  /  Alb  2.8<L>  /  TBili  1.1  /  DBili  x   /  AST  15  /  ALT  28  /  AlkPhos  50  12-18          CAPILLARY BLOOD GLUCOSE        PT/INR - ( 18 Dec 2020 01:23 )   PT: 14.2 sec;   INR: 1.23 ratio         PTT - ( 18 Dec 2020 01:23 )  PTT:24.7 sec    CULTURES:  Culture Results:   No growth to date. (12-14 @ 12:11)  Culture Results:   No growth to date. (12-14 @ 12:11)  Rapid RVP Result: NotDetec (12-14 @ 09:53)      Physical Examination:    General: No acute distress.  Alert, oriented, interactive, nonfocal on NIPPV    HEENT: Pupils equal, reactive to light.  Symmetric, sclera anicteric    PULM: + insp rales bilateral bases, diminished bilateral bases no wheezing appreciated    CVS: irregular rate and rhythm, + murmur    ABD: Soft, nondistended, nontender, normoactive bowel sounds, no rebound or guarding    EXT: No edema, nontender    SKIN: Warm and well perfused, no rashes noted.    RADIOLOGY:   EXAM:  CT ANGIO CHEST (W)AW                             PROCEDURE DATE:  12/18/2020          INTERPRETATION:  CLINICAL INFORMATION: Shortness of breath with atrial fibrillation. Evaluate for pulmonary embolism.    COMPARISON: None.    PROCEDURE:  CT Angiography of the Chest.  78 ml of Omnipaque 350 was injected intravenously. Sagittal and coronal reformats were performed as well as 3D (MIP) reconstructions.    FINDINGS:    LUNGS AND AIRWAYS: PLEURA:  There is a moderate size right-sided pleural effusion and small left-sided pleural effusion, both with underlying compressive atelectasis.    There are bilateral perihilar upper lobe and upper lobe groundglass and airspace consolidations, more pronounced on the right. The    The central airways are patent.    MEDIASTINUM AND TIFFANIE: No enlarged lymphadenopathy.    VESSELS: Evaluation of the subsegmental pulmonary arterial branches in the bilateral lower lobes is limited due to artifact. Otherwise, there is no CT evidence for acute pulmonary embolism.    HEART: Heart size is normal. No pericardial effusion.    CHEST WALL AND LOWER NECK: Within normal limits.    VISUALIZED UPPER ABDOMEN:  Partially imaged is cystic lesion left upper abdomen, which could reflect a renal cyst.    BONES: Within normal limits.    IMPRESSION:    No acute pulmonary embolism demonstrated.    Bilateral upper lobe groundglass/airspace consolidation with bilateral pleural effusions and underlying compressive atelectasis  Findings may be secondary to pulmonary edema, cannot exclude superimposed infection.    Other findings as discussed above.              DANA NICOLAS M.D., ATTENDING RADIOLOGIST  This document has been electronically signed. Dec 18 2020  3:30PM      CRITICAL CARE TIME SPENT: 35 mins assessing presenting problems of acute illness that poses high probability of life threatening deterioration or end organ damage/dysfunction.  Medical decision making including Initiating plan of care, reviewing data, reviewing radiology, direct patient bedside evaluation and interpretation of vital signs, any necessary ventilator management , discussion with multidisciplinary team, all non inclusive of procedures

## 2020-12-19 NOTE — PROGRESS NOTE ADULT - PROBLEM SELECTOR PLAN 6
Lopessor 80 mg IV BID for new onset Afib    IMPROVE VTE Individual Risk Assessment  RISK                                                                Points  [  ] Previous VTE                                                  3  [  ] Thrombophilia                                               2  [  ] Lower limb paralysis                                      2        (unable to hold up >15 seconds)    [  ] Current Cancer                                              2         (within 6 months)  [  ] Immobilization > 24 hrs                                1  [  ] ICU/CCU stay > 24 hours                              1  [ x ] Age > 60                                                      1  IMPROVE VTE Score:  1  IMPROVE Score 0-1: Low Risk, No VTE prophylaxis required for most patients, encourage ambulation.

## 2020-12-19 NOTE — PROGRESS NOTE ADULT - SUBJECTIVE AND OBJECTIVE BOX
Date/Time Patient Seen:  		  Referring MD:   Data Reviewed	       Patient is a 65y old  Male who presents with a chief complaint of Aspiration PNA (19 Dec 2020 07:37)      Subjective/HPI     PAST MEDICAL & SURGICAL HISTORY:  History of left foot drop    Murmur, cardiac    Myasthenia gravis    HTN (hypertension)    History of back surgery          Medication list         MEDICATIONS  (STANDING):  atorvastatin 40 milliGRAM(s) Oral at bedtime  chlorhexidine 2% Cloths 1 Application(s) Topical <User Schedule>  diltiazem Infusion 15 mG/Hr (15 mL/Hr) IV Continuous <Continuous>  enoxaparin Injectable 80 milliGRAM(s) SubCutaneous two times a day  furosemide   Injectable 40 milliGRAM(s) IV Push two times a day  metoprolol tartrate 25 milliGRAM(s) Oral every 6 hours  piperacillin/tazobactam IVPB.. 3.375 Gram(s) IV Intermittent every 8 hours    MEDICATIONS  (PRN):         Vitals log        ICU Vital Signs Last 24 Hrs  T(C): 37.1 (18 Dec 2020 18:00), Max: 37.1 (18 Dec 2020 18:00)  T(F): 98.7 (18 Dec 2020 18:00), Max: 98.7 (18 Dec 2020 18:00)  HR: 133 (19 Dec 2020 07:00) (81 - 141)  BP: 130/82 (19 Dec 2020 07:00) (95/61 - 130/82)  BP(mean): 101 (19 Dec 2020 07:00) (73 - 101)  ABP: --  ABP(mean): --  RR: 43 (19 Dec 2020 07:00) (20 - 43)  SpO2: 91% (19 Dec 2020 07:00) (85% - 99%)           Input and Output:  I&O's Detail    18 Dec 2020 07:01  -  19 Dec 2020 07:00  --------------------------------------------------------  IN:    Diltiazem: 210 mL    IV PiggyBack: 50 mL    IV PiggyBack: 200 mL  Total IN: 460 mL    OUT:    Voided (mL): 800 mL  Total OUT: 800 mL    Total NET: -340 mL          Lab Data                        13.0   12.48 )-----------( 246      ( 19 Dec 2020 07:05 )             37.9     12-19    135  |  97  |  18  ----------------------------<  113<H>  4.0   |  28  |  0.91    Ca    8.2<L>      19 Dec 2020 07:05    TPro  6.5  /  Alb  2.8<L>  /  TBili  1.1  /  DBili  x   /  AST  15  /  ALT  28  /  AlkPhos  50  12-18            Review of Systems	      Objective     Physical Examination    heart s1s2  tachy  lung dec BS  abd soft      Pertinent Lab findings & Imaging      Tyree:  NO   Adequate UO     I&O's Detail    18 Dec 2020 07:01  -  19 Dec 2020 07:00  --------------------------------------------------------  IN:    Diltiazem: 210 mL    IV PiggyBack: 50 mL    IV PiggyBack: 200 mL  Total IN: 460 mL    OUT:    Voided (mL): 800 mL  Total OUT: 800 mL    Total NET: -340 mL               Discussed with:     Cultures:	        Radiology

## 2020-12-19 NOTE — PROGRESS NOTE ADULT - PROBLEM SELECTOR PLAN 3
Chronic  - BP is on softer side now s/p cardizem 10mg IVP x3.  - will hold home amlodipine, metoprolol, triamterene HCTZ, for now  - routine HD monitoring Chronic  - BP is on softer side now s/p cardizem 10mg IVP x3.  - will hold home amlodipine, triamterene HCTZ, for now  - routine HD monitoring

## 2020-12-19 NOTE — PROGRESS NOTE ADULT - ATTENDING COMMENTS
Av Palomino M.D.  [facility], Division of Infectious Diseases  [phone]  After 5pm on weekdays and all day on weekends - please call [phone]

## 2020-12-19 NOTE — DIETITIAN INITIAL EVALUATION ADULT. - OTHER INFO
As per chart pt is a 65 year old male with a PMH of HTN, HLD, myasthenia gravis, and an "arrythmia" admitted w/ Acute hypoxic respiratory failure, Lobar PNA, Decompensated heart failure, Pulmonary edema, A fib w/ RVR.    Pt seen at bedside. Pt is currently NPO and on BiPAP. Pt seen by SLP on 12/18 recommending Regular solids with thin liquids. Per chart pt will remain NPO until respiratory status improves. Pt's admission weight 170lbs. No GI distress noted at this time, no BM since admission.    No pressure injuries noted at this time.

## 2020-12-19 NOTE — PROGRESS NOTE ADULT - PROBLEM SELECTOR PLAN 3
CT with pleural effusion, pulmonary following - recommended holding lovenox for possible right sided thoracocentesis

## 2020-12-19 NOTE — PROGRESS NOTE ADULT - PROBLEM SELECTOR PLAN 1
HF eval - TTE - cardio eval - I and O - diuresis  PNA - cont emp ABX - ct chest reviewed - cx and labs and biomarkers  AF - tachyarrhythmia - on Cardizem gtt - remains tachy - optimization in progress  Pleural eff - will benefit from right side thoracentesis - needs to be off Lovenox  Resp Distress - Multifactorial - HF AF PNA Effusion  ICU management and monitoring in progress  Serial CE  serial labs  replete lytes  keep sat > 90 pct

## 2020-12-19 NOTE — PROGRESS NOTE ADULT - ASSESSMENT
Impression:  1. acute hypoxemic respiratory failure  2. afib with RVR  3. pleural effusion  4. compressive atelectasis  5. PNA  6. acute heart failure likely diastolic    Plan:  Neuro - avoiding neurosuppressants, melatonin for sleep hygiene PRN    CV -  cont diltiazem infusion for rate control with standing lopressor          keep K~4 and Mg>2 for optimal arrhythmia control           full AC with tx dose lovenox, current [id number] 2           check TSH/echo, EKG nonischemic and trop negative            f/u with cardio    Pulm - cont NIPPV overnight, will attempt repeat trial off in am            actively titrating FiO2 for sats>90%, weaned to 50%            cont IV diuresis            if unable to wean off NIPPV, consideration for drainage of R effusion given size and attempt to optimize pulmonary status            f/u with pulmonary    GI -  NPO for now while on NIPPV    Renal - Cr stable, negative fluid balance as tolerates, monitor I/Os    Heme -  Full dose AC with tx dose lovenox, SCDs, no evidence of bleeding.    ID - Cont empiric IV abx with Zosyn as per ID, legionella/Cxs pending, Abx adjustment based on discussion with ID in conjunction with clinical features and culture data.    Endo -  check A1c and TSH, BS stable. 65 M history of Myasthenia gravis, HTN, left foot drop presents with worsening shortness of breath. Recently seen in [facility] ED 4 days ago for PNA. Admitted for acute hypoxic respiratory failure 2/2 PNA vs decompensated HF requiring BiPAP. Also found to be in new onset Afib w/ RVR at 149.    Neuro - avoiding neurosuppressants, melatonin for sleep hygiene PRN    CV -  cont diltiazem infusion for rate control with standing lopressor          keep K~4 and Mg>2 for optimal arrhythmia control           full AC with tx dose lovenox, current [id number] 2           check TSH/echo, EKG nonischemic and trop negative            f/u with cardio    Pulm - cont NIPPV overnight, will attempt repeat trial off in am            actively titrating FiO2 for sats>90%, weaned to 50%            cont IV diuresis            if unable to wean off NIPPV, consideration for drainage of R effusion given size and attempt to optimize pulmonary status            f/u with pulmonary    GI -  NPO for now while on NIPPV    Renal - Cr stable, negative fluid balance as tolerates, monitor I/Os    Heme -  Full dose AC with tx dose lovenox, SCDs, no evidence of bleeding.    ID - Cont empiric IV abx with Zosyn as per ID, legionella/Cxs pending, Abx adjustment based on discussion with ID in conjunction with clinical features and culture data.    Endo -  check A1c and TSH, BS stable. 65 M history of Myasthenia gravis, HTN, left foot drop presents with worsening shortness of breath. Recently seen in [facility] ED 4 days ago for PNA. Admitted for acute hypoxic respiratory failure 2/2 PNA vs decompensated HF requiring BiPAP. Also found to be in new onset Afib w/ RVR at 149.    Neuro - no active issues, A&Ox3  CV - hemodynamically stable, a-fib with RVR-rate controlled in 90s with diltiazem gtt at 10 mL/hr and lopressor 25 mg q6h, full AC with lovenox; HTN-hold home anti-hypertensives for now; CHF-continue IV Lasix 40 mg BID, f/u TTE  Pulm - PNA vs. CHF exacerbation-cont BiPAP, wean as tolerated,             cont IV diuresis            if unable to wean off NIPPV, consideration for drainage of R effusion given size and attempt to optimize pulmonary status            f/u with pulmonary    GI -  NPO for now while on NIPPV    Renal - Cr stable, negative fluid balance as tolerates, monitor I/Os    Heme -  Full dose AC with tx dose lovenox, SCDs, no evidence of bleeding.    ID - Cont empiric IV abx with Zosyn as per ID, legionella/Cxs pending, Abx adjustment based on discussion with ID in conjunction with clinical features and culture data.    Endo -  check A1c and TSH, BS stable. 65 M history of Myasthenia gravis, HTN, left foot drop presents with worsening shortness of breath. Recently seen in [facility] ED 4 days ago for PNA. Admitted for acute hypoxic respiratory failure 2/2 PNA vs decompensated HF requiring BiPAP. Also found to be in new onset Afib w/ RVR at 149.    Neuro - no active issues, A&Ox3  CV - hemodynamically stable, a-fib with RVR-rate controlled in 90s with diltiazem gtt at 10 mL/hr and lopressor 25 mg q6h, full AC with lovenox; HTN-hold home anti-hypertensives for now; CHF-continue IV Lasix 40 mg BID, f/u TTE  Pulm - PNA vs. CHF exacerbation-cont BiPAP, wean as tolerated, if unable to wean off NIPPV, consideration for drainage of R effusion given size and attempt to optimize pulmonary status  GI -  NPO except meds while on BiPAP  Renal - Cr stable, negative fluid balance as tolerates, monitor I/Os  Heme - Full dose AC with tx dose lovenox, SCDs, no evidence of bleeding.  ID - Cont empiric IV abx with Zosyn as per ID, f/u legionella  Endo - TSH .3, f/u AM total T3/T4, BS stable, f/u HbA1c 65 M history of Myasthenia gravis, HTN, left foot drop presents with worsening shortness of breath. Recently seen in [facility] ED 4 days ago for PNA. Admitted for acute hypoxic respiratory failure 2/2 PNA vs decompensated HF requiring BiPAP. Also found to be in new onset Afib w/ RVR at 149.    Neuro - no active issues, A&Ox3  CV - hemodynamically stable, a-fib with RVR-rate controlled in 90s with diltiazem gtt at 15 mg/hr and lopressor 25 mg q6h, full AC with lovenox; HTN-hold home anti-hypertensives for now; CHF-continue IV Lasix 40 mg BID, TTE showing MVP with severe MR. Patient's outpatient cardiologist Dr. Lei Najera reports echo from last year with MVP and mild MR, possible chordae tendinae rupture, will discuss with cardiologist, may need to be transferred for possible procedure  Pulm - PNA vs. CHF exacerbation-cont BiPAP, wean as tolerated, if unable to wean off NIPPV, consideration for drainage of R effusion given size and attempt to optimize pulmonary status  GI -  NPO except meds while on BiPAP  Renal - Cr stable, negative fluid balance as tolerates, monitor I/Os  Heme - Full dose AC with tx dose lovenox, SCDs, no evidence of bleeding.  ID - Cont empiric IV abx with Zosyn as per ID, f/u legionella  Endo - TSH .3, f/u AM total T3/T4, BS stable, f/u HbA1c

## 2020-12-19 NOTE — PROGRESS NOTE ADULT - ATTENDING COMMENTS
66 yo man with MG, htn treated for outpt CAP but with worsening dyspnea and admitted yesterday, found to be in new onset Afib with RVR and acute hypoxemic respiratory failure from pulmonary edema.  Concern for worsening mitral valve regurg on echo.      --mentating well  --Afib controlled on dilt gtt, continue AC with lovenox  continue lopressor and statin  continue diuresis  discussed with outpt cardiologist (Dr Najera at [facility], cell [phone]), echo 1yr ago showed MVP with mild MR  echo today prelim is at least moderate MR  discussed with cardiology, will plan for transfer to tertiary center for valve evaluation, family deciding on location  --acute hypoxemic respiratory failure    admit to ICU.  respiratory status improved on BiPAP  Afib, on dilt gtt, lovenox AC  diurese  normal renal function  NPO until respiratory status improves  zosyn for failed outpt CAP treatment, pending Cx data  plan discussed with pt 64 yo man with MG, htn treated for outpt CAP but with worsening dyspnea and admitted yesterday, found to be in new onset Afib with RVR and acute hypoxemic respiratory failure from pulmonary edema.  Concern for worsening mitral valve regurg on echo.      --mentating well  --Afib controlled on dilt gtt, continue AC with lovenox  continue lopressor and statin  continue diuresis  discussed with outpt cardiologist (Dr Najera at [facility], cell [phone]), echo 1yr ago showed MVP with mild MR  echo today prelim is at least moderate MR  discussed with cardiology, will plan for transfer to tertiary center for valve evaluation, family deciding on location  --acute hypoxemic respiratory failure  comfortable on BiPAP 10/6 50% FiO2  spent about 2hrs on FM 50% but felt dyspneic with labored respirations and returned to BiPAP  --NPO for now until respiratory status improves  --normal renal function  --possible concurrent pneumonia, failed CAP treatment with augmentin, continue zosyn  --low TSH, check full panel  --discussed in detail with Dr. Rodgers, outpt cardiologist Dr. Najera, brother Dr. Lam, wife Kirsten, and pt  --Kirsten informed me this evening family would prefer [place] for MV evaluation.  Will arrange transfer in am.   --pt critically ill. CC time 60min

## 2020-12-19 NOTE — PROGRESS NOTE ADULT - SUBJECTIVE AND OBJECTIVE BOX
***charting in progress***   Patient is a 65y old  Male who presents with a chief complaint of Aspiration PNA (19 Dec 2020 06:49)    Interval events:     Review of Systems:  Constitutional: no fever, chills, fatigue  Neuro: no headache, numbness, weakness  Resp: no cough, wheezing, shortness of breath  CVS: no chest pain, palpitations, leg swelling  GI: no abdominal pain, nausea, vomiting, diarrhea   : no dysuria, frequency, incontinence  Skin: no itching, burning, rashes, or lesions   Msk: no joint pain or swelling  Psych: no depression, anxiety    T(F): 98.7 (12-18-20 @ 18:00), Max: 98.7 (12-18-20 @ 18:00)  HR: 133 (12-19-20 @ 07:00) (81 - 141)  BP: 130/82 (12-19-20 @ 07:00) (95/61 - 130/82)  RR: 43 (12-19-20 @ 07:00) (20 - 43)  SpO2: 91% (12-19-20 @ 07:00) (85% - 99%)  Wt(kg): --        CAPILLARY BLOOD GLUCOSE        I&O's Summary    18 Dec 2020 07:01  -  19 Dec 2020 07:00  --------------------------------------------------------  IN: 460 mL / OUT: 800 mL / NET: -340 mL        Physical Exam:     Gen:  Neuro:  HEENT:  CV:  Pulm:  GI:  Ext:  Skin:    Meds:  MEDICATIONS  (STANDING):  atorvastatin 40 milliGRAM(s) Oral at bedtime  chlorhexidine 2% Cloths 1 Application(s) Topical <User Schedule>  diltiazem Infusion 15 mG/Hr (15 mL/Hr) IV Continuous <Continuous>  enoxaparin Injectable 80 milliGRAM(s) SubCutaneous two times a day  furosemide   Injectable 40 milliGRAM(s) IV Push two times a day  metoprolol tartrate 25 milliGRAM(s) Oral every 6 hours  piperacillin/tazobactam IVPB.. 3.375 Gram(s) IV Intermittent every 8 hours    MEDICATIONS  (PRN):                            13.0   12.48 )-----------( 246      ( 19 Dec 2020 07:05 )             37.9       12-19    135  |  97  |  18  ----------------------------<  113<H>  4.0   |  28  |  0.91    Ca    8.2<L>      19 Dec 2020 07:05    TPro  6.5  /  Alb  2.8<L>  /  TBili  1.1  /  DBili  x   /  AST  15  /  ALT  28  /  AlkPhos  50  12-18          PT/INR - ( 18 Dec 2020 01:23 )   PT: 14.2 sec;   INR: 1.23 ratio         PTT - ( 18 Dec 2020 01:23 )  PTT:24.7 sec    .Blood Blood-Peripheral   No growth to date. -- 12-14 @ 12:11      Rapid RVP Result: [employer] (12-14 @ 09:53)        Radiology: ***  Bedside Lung U/S: ***  Bedside Cardiac U/S: ***    CENTRAL LINE: Y/N   DATE INSERTED:   REMOVE: Y/N  CHATTERJEE: Y/N      DATE INSERTED:        REMOVE: Y/N  A-LINE: Y/N     DATE INSERTED:              REMOVE: Y/N    GLOBAL ISSUE/BEST PRACTICE:  Analgesia:  Sedation:  HOB elevation: yes  Stress ulcer prophylaxis:  VTE prophylaxis:  Glycemic control:  Nutrition:    CODE STATUS: ***  [facility] discussion: Y       Patient is a 65y old  Male who presents with a chief complaint of Aspiration PNA (19 Dec 2020 06:49)    Interval events: Patient seen and examined at bedside. No acute overnight events but was on BiPAP overnight. Breathing comfortably with BiPAP but states that whenever he takes it off he feels short of breath. BiPAP settings 50% FiO2, 10/6. Otherwise, no acute complaints.    Review of Systems:  Constitutional: no fever, chills  Neuro: no headache, numbness  Resp: no cough, wheezing, shortness of breath  CVS: no chest pain, palpitations, leg swelling  GI: no abdominal pain, nausea, vomiting   Msk: no joint pain or swelling    T(F): 98.7 (12-18-20 @ 18:00), Max: 98.7 (12-18-20 @ 18:00)  HR: 133 (12-19-20 @ 07:00) (81 - 141)  BP: 130/82 (12-19-20 @ 07:00) (95/61 - 130/82)  RR: 43 (12-19-20 @ 07:00) (20 - 43)  SpO2: 91% (12-19-20 @ 07:00) (85% - 99%)  Wt(kg): --        CAPILLARY BLOOD GLUCOSE        I&O's Summary    18 Dec 2020 07:01  -  19 Dec 2020 07:00  --------------------------------------------------------  IN: 460 mL / OUT: 800 mL / NET: -340 mL        Physical Exam:     Gen: frail male in NAD  Neuro: alert and oriented x3, answers questions appropriately  HEENT: NC/AT  CV: RRR +S1/S2  Pulm: crackles in left lung base, rales in right lung base, decreased breath sounds b/l  GI: soft, nontender, nondistended  Ext: no clubbing, cyanosis, edema  Skin: warm, dry    Meds:  MEDICATIONS  (STANDING):  atorvastatin 40 milliGRAM(s) Oral at bedtime  chlorhexidine 2% Cloths 1 Application(s) Topical <User Schedule>  diltiazem Infusion 15 mG/Hr (15 mL/Hr) IV Continuous <Continuous>  enoxaparin Injectable 80 milliGRAM(s) SubCutaneous two times a day  furosemide   Injectable 40 milliGRAM(s) IV Push two times a day  metoprolol tartrate 25 milliGRAM(s) Oral every 6 hours  piperacillin/tazobactam IVPB.. 3.375 Gram(s) IV Intermittent every 8 hours    MEDICATIONS  (PRN):                            13.0   12.48 )-----------( 246      ( 19 Dec 2020 07:05 )             37.9       12-19    135  |  97  |  18  ----------------------------<  113<H>  4.0   |  28  |  0.91    Ca    8.2<L>      19 Dec 2020 07:05    TPro  6.5  /  Alb  2.8<L>  /  TBili  1.1  /  DBili  x   /  AST  15  /  ALT  28  /  AlkPhos  50  12-18          PT/INR - ( 18 Dec 2020 01:23 )   PT: 14.2 sec;   INR: 1.23 ratio         PTT - ( 18 Dec 2020 01:23 )  PTT:24.7 sec    .Blood Blood-Peripheral   No growth to date. -- 12-14 @ 12:11      Rapid RVP Result: NotDetec (12-14 @ 09:53)        Radiology:   < from: CT Angio Chest w/ IV Cont (12.18.20 @ 14:39) >  IMPRESSION:    No acute pulmonary embolism demonstrated.    Bilateral upper lobe groundglass/airspace consolidation with bilateral pleural effusions and underlying compressive atelectasis  Findings may be secondary to pulmonary edema, cannot exclude superimposed infection.    Other findings as discussed above.    < end of copied text >    < from: Xray Chest 1 View AP/PA (12.18.20 @ 01:34) >  Impression: No worsening of extensive right lung airspace disease and small right pleural effusion.    < end of copied text >      CENTRAL LINE: N  CHATTERJEE: N    A-LINE: N        GLOBAL ISSUE/BEST PRACTICE:  Analgesia: no  Sedation: no  HOB elevation: yes  Stress ulcer prophylaxis: no  VTE prophylaxis: yes  Glycemic control: no  Nutrition: NPO except meds    CODE STATUS: full code  [facility] discussion: JOE       Patient is a 65y old  Male who presents with a chief complaint of Aspiration PNA (19 Dec 2020 06:49)    Interval events: Patient seen and examined at bedside. No acute overnight events but was on BiPAP overnight. Breathing comfortably with BiPAP but states that whenever he takes it off he feels short of breath. BiPAP settings 50% FiO2, 10/6. Otherwise, no acute complaints.        T(F): 98.7 (12-18-20 @ 18:00), Max: 98.7 (12-18-20 @ 18:00)  HR: 133 (12-19-20 @ 07:00) (81 - 141)  BP: 130/82 (12-19-20 @ 07:00) (95/61 - 130/82)  RR: 43 (12-19-20 @ 07:00) (20 - 43)  SpO2: 91% (12-19-20 @ 07:00) (85% - 99%)  Wt(kg): --        CAPILLARY BLOOD GLUCOSE        I&O's Summary    18 Dec 2020 07:01  -  19 Dec 2020 07:00  --------------------------------------------------------  IN: 460 mL / OUT: 800 mL / NET: -340 mL        Physical Exam:     Gen: frail male in NAD  Neuro: alert and oriented x3, answers questions appropriately  HEENT: NC/AT  CV: RRR +S1/S2  Pulm: crackles in left lung base, rales in right lung base, decreased breath sounds b/l  GI: soft, nontender, nondistended  Ext: no clubbing, cyanosis, edema  Skin: warm, dry    Meds:  MEDICATIONS  (STANDING):  atorvastatin 40 milliGRAM(s) Oral at bedtime  chlorhexidine 2% Cloths 1 Application(s) Topical <User Schedule>  diltiazem Infusion 15 mG/Hr (15 mL/Hr) IV Continuous <Continuous>  enoxaparin Injectable 80 milliGRAM(s) SubCutaneous two times a day  furosemide   Injectable 40 milliGRAM(s) IV Push two times a day  metoprolol tartrate 25 milliGRAM(s) Oral every 6 hours  piperacillin/tazobactam IVPB.. 3.375 Gram(s) IV Intermittent every 8 hours    MEDICATIONS  (PRN):                            13.0   12.48 )-----------( 246      ( 19 Dec 2020 07:05 )             37.9       12-19    135  |  97  |  18  ----------------------------<  113<H>  4.0   |  28  |  0.91    Ca    8.2<L>      19 Dec 2020 07:05    TPro  6.5  /  Alb  2.8<L>  /  TBili  1.1  /  DBili  x   /  AST  15  /  ALT  28  /  AlkPhos  50  12-18          PT/INR - ( 18 Dec 2020 01:23 )   PT: 14.2 sec;   INR: 1.23 ratio         PTT - ( 18 Dec 2020 01:23 )  PTT:24.7 sec    .Blood Blood-Peripheral   No growth to date. -- 12-14 @ 12:11      Rapid RVP Result: NotDetec (12-14 @ 09:53)        Radiology:   < from: CT Angio Chest w/ IV Cont (12.18.20 @ 14:39) >  IMPRESSION:    No acute pulmonary embolism demonstrated.    Bilateral upper lobe groundglass/airspace consolidation with bilateral pleural effusions and underlying compressive atelectasis  Findings may be secondary to pulmonary edema, cannot exclude superimposed infection.    Other findings as discussed above.    < end of copied text >    < from: Xray Chest 1 View AP/PA (12.18.20 @ 01:34) >  Impression: No worsening of extensive right lung airspace disease and small right pleural effusion.    < end of copied text >      CENTRAL LINE: N  CHATTERJEE: N    A-LINE: N        GLOBAL ISSUE/BEST PRACTICE:  Analgesia: no  Sedation: no  HOB elevation: yes  Stress ulcer prophylaxis: no  VTE prophylaxis: yes  Glycemic control: no  Nutrition: NPO except meds    CODE STATUS: full code

## 2020-12-19 NOTE — DIETITIAN INITIAL EVALUATION ADULT. - ADD RECOMMEND
1) When medically feasible recommend to advance diet to Low sodium, 2) If pt remains NPO for prolonged period of time due to respiratory issues consider alternative means of nutrition; enteral vs. parenteral nutrition, 3) Encourage adequate PO intake when PO diet is initiated, 4) Monitor pt's PO intake, weight, skin, edema, GI distress

## 2020-12-19 NOTE — PROGRESS NOTE ADULT - SUBJECTIVE AND OBJECTIVE BOX
· Subjective and Objective:   [facility] CARDIOLOGY CONSULTANTS:    Jennyfer Gillis, Romulo Baker, Candelario Gifford Savella, [place]      [phone]    CHIEF COMPLAINT:     FOLLOW UP:     INTERIM HISTORY: Pt has no complaints today, there were no overnight events    TELEMETRY:    ROS otherwise negative unless noted        PAST MEDICAL & SURGICAL HISTORY:  History of left foot drop    Murmur, cardiac    Myasthenia gravis    HTN (hypertension)    History of back surgery        MEDICATIONS  (STANDING):  atorvastatin 40 milliGRAM(s) Oral at bedtime  chlorhexidine 2% Cloths 1 Application(s) Topical <User Schedule>  diltiazem Infusion 15 mG/Hr (15 mL/Hr) IV Continuous <Continuous>  enoxaparin Injectable 80 milliGRAM(s) SubCutaneous two times a day  furosemide   Injectable 40 milliGRAM(s) IV Push two times a day  metoprolol tartrate 25 milliGRAM(s) Oral every 6 hours  piperacillin/tazobactam IVPB.. 3.375 Gram(s) IV Intermittent every 8 hours      Allergies    No Known Allergies    Intolerances                              13.0   12.48 )-----------( 246      ( 19 Dec 2020 07:05 )             37.9       12-19    135  |  97  |  18  ----------------------------<  113<H>  4.0   |  28  |  0.91    Ca    8.2<L>      19 Dec 2020 07:05  Phos  3.3     12-19  Mg     2.4     12-19    TPro  6.5  /  Alb  2.8<L>  /  TBili  1.1  /  DBili  x   /  AST  15  /  ALT  28  /  AlkPhos  50  12-18      LIVER FUNCTIONS - ( 18 Dec 2020 07:14 )  Alb: 2.8 g/dL / Pro: 6.5 g/dL / ALK PHOS: 50 U/L / ALT: 28 U/L / AST: 15 U/L / GGT: x             PT/INR - ( 18 Dec 2020 01:23 )   PT: 14.2 sec;   INR: 1.23 ratio         PTT - ( 18 Dec 2020 01:23 )  PTT:24.7 sec                      Daily     Daily     I&O's Summary    18 Dec 2020 07:01  -  19 Dec 2020 07:00  --------------------------------------------------------  IN: 460 mL / OUT: 800 mL / NET: -340 mL        Vital Signs Last 24 Hrs  T(C): 37.1 (18 Dec 2020 18:00), Max: 37.1 (18 Dec 2020 18:00)  T(F): 98.7 (18 Dec 2020 18:00), Max: 98.7 (18 Dec 2020 18:00)  HR: 97 (19 Dec 2020 07:30) (81 - 141)  BP: 130/82 (19 Dec 2020 07:00) (95/61 - 130/82)  BP(mean): 101 (19 Dec 2020 07:00) (73 - 101)  RR: 43 (19 Dec 2020 07:00) (20 - 43)  SpO2: 94% (19 Dec 2020 07:30) (85% - 99%)    PHYSICAL EXAM:   · Constitutional	Well-developed, well nourished  · Eyes	EOMI; PERRL; no drainage or redness  · ENMT	No oral lesions; no gross abnormalities  · Neck	No bruits; no thyromegaly or nodules  · Respiratory	Normal breath sounds b/l, No RRW  · Cardiovascular	Regular rate & rhythm, normal S1, S2; no murmurs, gallops or rubs; no S3, S4  · Gastrointestinal	Soft, non-tender, no hepatosplenomegaly, normal bowel sounds  · Extremities	No cyanosis, clubbing or edema  · Vascular	Equal and normal pulses (carotid, femoral, dorsalis pedis)  · Neurological	Alert & oriented; no sensory, motor or coordination deficits, normal reflexes

## 2020-12-19 NOTE — PROGRESS NOTE ADULT - ASSESSMENT
Impression:  1. acute hypoxemic respiratory failure  2. afib with RVR  3. pleural effusion  4. compressive atelectasis  5. PNA  6. acute heart failure likely diastolic    Plan:  Neuro - avoiding neurosuppressants, melatonin for sleep hygiene PRN    CV -  cont diltiazem infusion for rate control with standing lopressor          keep K~4 and Mg>2 for optimal arrhythmia control           full AC with tx dose lovenox, current [id number]           check TSH/echo, EKG nonischemic and trop negative            f/u with cardio    Pulm - cont NIPPV overnight, will attempt repeat trial off in am            actively titrating FiO2 for sats>90%, weaned to 50%            cont IV diuresis            if unable to wean off NIPPV, consideration for drainage of R effusion given size and attempt to optimize pulmonary status            f/u with pulmonary    GI -  NPO for now while on NIPPV    Renal - Cr stable, negative fluid balance as tolerates, monitor I/Os    Heme -  Full dose AC with tx dose lovenox, SCDs, no evidence of bleeding.    ID - Cont empiric IV abx with Zosyn as per ID, legionella/Cxs pending, Abx adjustment based on discussion with ID in conjunction with clinical features and culture data.    Endo -  check A1c and TSH, BS stable.

## 2020-12-19 NOTE — PROGRESS NOTE ADULT - ASSESSMENT
The patient is a 65 M history of Myasthenia gravis, HTN, left foot drop presents with shortness of breath. Patient was recently seen in [facility] ED 4 days ago for SOB, myalgias and cough. Covid test was negative. Now in Afib    # A.Fib: Likely 2/2 to CAP. Possible CHF exacerbation also contributing. Now on BiPAP  - Patient is in New onset A.fib   - EKG shows A.Fib. No signs of acute ischemic changes.   - Trops negative  - HR improving  - Patient is S/P Cardizem 10mg IV push x2 and was started on Cardizem infusion, initially 5ml/h and later titrated to 10 and then 15ml/h with no break in A.Fib in the ED  - uptitrate Metoprolol tartrate hold perimeters, continue Cardizem infusion at 15ml/hour with hopes to titrate off gtt  - c/w Lasix 40mg IV q12 hours for now  - Continue Lovenox full dose at 80 mg.   - Hold home amlodipine, metoprolol and Triamterene/ HTCZ  for now.   - continue with tele monitoring     # CHF: Likely 2/2 to valvular heart disease and exacerbated by PNA  - Recommend TTE  - BNP 4512-->3010. Trend BNP  - strict I&O  - daily weights  - CT chest shows b/l upper lobe airspace consolidation with b/l pleural effusions    # HTN  - monitor routine hemodynamics.  - management as above.  - Other cardiovascular workup will depend on clinical course. All other workup per primary team.  - Will follow.

## 2020-12-19 NOTE — PROGRESS NOTE ADULT - SUBJECTIVE AND OBJECTIVE BOX
[facility], Division of Infectious Diseases  JENNIFER Dennison Y. Patel, S. Shah  [phone]  ([phone] - weekdays after 5pm and weekends)    Name: MAGGIE RICHARD  Age/Gender: 65y Male  MRN: [id number]    Interval History:  Patient seen this morning, feels cough and dyspnea are better with BiPAP.  Denies fever, chills, chest pain, abd pain, n/v/d.  ROS reviewed, pertinent positives and negatives as above.   Notes reviewed. Afebrile.     Objective:  Vitals:   T(F): --  HR: 95 (12-19-20 @ 17:04) (81 - 133)  BP: 99/64 (12-19-20 @ 14:00) (93/64 - 130/82)  RR: 38 (12-19-20 @ 14:00) (30 - 43)  SpO2: 90% (12-19-20 @ 17:04) (88% - 97%)    Physical Examination:  General: no acute distress, BiPAP  HEENT: NC/AT, EOMI, anicteric, neck supple  Cardio: S1, S2 heard, RRR, no murmurs  Resp: decreased breath sounds bilaterally  Abd: soft, NT, ND, + BS  Neuro: AAOx3, no obvious focal deficits  Ext: no edema or cyanosis, moving extremities  Skin: warm, dry, no visible rash  Psych: appropriate affect and mood for situation  Lines: PIV    Laboratory Studies:  CBC:                       13.0   12.48 )-----------( 246      ( 19 Dec 2020 07:05 )             37.9     CMP: 12-19    135  |  97  |  18  ----------------------------<  113<H>  4.0   |  28  |  0.91    Ca    8.2<L>      19 Dec 2020 07:05  Phos  3.3     12-19  Mg     2.4     12-19    TPro  6.5  /  Alb  2.8<L>  /  TBili  1.1  /  DBili  x   /  AST  15  /  ALT  28  /  AlkPhos  50  12-18    LIVER FUNCTIONS - ( 18 Dec 2020 07:14 )  Alb: 2.8 g/dL / Pro: 6.5 g/dL / ALK PHOS: 50 U/L / ALT: 28 U/L / AST: 15 U/L / GGT: x       dede    Microbiology:  12/18 - COVID-19 ab - negative  12/18 - COVID-19 PCR - negative  12/14 - blood cultures - negative   12/14 - RVP + COVID-19 by SADIA - negative     Radiology:  CT Angio Chest w/ IV Cont (12.18.20 @ 14:39) > IMPRESSION: No acute pulmonary embolism demonstrated. Bilateral upper lobe groundglass/airspace consolidation with bilateral pleural effusions and underlying compressive atelectasis  Findings may be secondary to pulmonary edema, cannot exclude superimposed infection.  Other findings as discussed above.    Xray Chest 1 View AP/PA (12.14.20 @ 09:34) > IMPRESSION: Nonspecific airspace opacity right lung base. Findings could represent pneumonia in the correct clinical setting. Follow-up recommended. Small right pleural effusion Heart size cannot be accurately assessed in this projection.    Medications:  atorvastatin 40 milliGRAM(s) Oral at bedtime  chlorhexidine 2% Cloths 1 Application(s) Topical <User Schedule>  diltiazem Infusion 15 mG/Hr IV Continuous <Continuous>  enoxaparin Injectable 80 milliGRAM(s) SubCutaneous two times a day  furosemide   Injectable 40 milliGRAM(s) IV Push two times a day  metoprolol tartrate 25 milliGRAM(s) Oral every 6 hours  piperacillin/tazobactam IVPB.. 3.375 Gram(s) IV Intermittent every 8 hours    Antimicrobials:  piperacillin/tazobactam IVPB.. 3.375 Gram(s) IV Intermittent every 8 hours - started 12/18

## 2020-12-19 NOTE — PROGRESS NOTE ADULT - ASSESSMENT
65 M history of Myasthenia gravis, HTN, left foot drop presents with worsening shortness of breath. Recently seen in [facility] ED 4 days ago for PNA. Admitted for acute hypoxic respiratory failure 2/2 aspiration PNA. Found to be in new onset Afib w/RVR at 149.              65 M history of Myasthenia gravis, HTN, left foot drop presents with worsening shortness of breath. Recently seen in [facility] ED 4 days ago for PNA. Admitted for acute hypoxic respiratory failure 2/2 aspiration PNA. Found to be in new onset Afib w/RVR at 149. Admitted to ICU on 12/18 for persistent afib w/ RVR managed with diltiazem drip.

## 2020-12-19 NOTE — DIETITIAN INITIAL EVALUATION ADULT. - PROBLEM SELECTOR PLAN 3
Chronic  - BP is on softer side now s/p cardizem 10mg IVP x3.  - will hold home amlodipine, metoprolol for now  - started on cardizem gtt at 5mg/hr will titrate to 10mg/hr if needed.  - routine HD monitoring

## 2020-12-19 NOTE — DIETITIAN INITIAL EVALUATION ADULT. - PROBLEM SELECTOR PLAN 1
Acute hypoxic respiratory failure likely 2/2 aspiration PNA likely 2/2 aspiration PNA given right side pleural effusion. Patient was seen in [facility] ED 4 days ago, covid pcr negative at that time. Now returning with worsening symptoms and hypoxia.   - s/p Zosyn IV x1, Vancomycin IV x 1 in ED  - COVID pending  - D-dimer, ferritin, CRP added to lab draw, f/u  - f/u blood cultures, urine cultures, procal, legionella, strep pneumo antigen urine  - will continue zosyn for now.  - ID consulted, f/u recs  - Abdirahman Espinosa consulted, f/u recs

## 2020-12-19 NOTE — PROGRESS NOTE ADULT - PROBLEM SELECTOR PLAN 2
CXR with nonspecific airspace opacity at R lung base, small pleural effusion.   Now presented with worsening dyspnea and cough.   Plan as above. Continue on pip-tazo.

## 2020-12-19 NOTE — PROGRESS NOTE ADULT - PROBLEM SELECTOR PLAN 1
due to right sided pneumonia and pleural effusion, new onset afib and possible CHF exacerbation  Was in the ER 4 days PTA, COVID and RVP negative - discharged on augmentin for CAP. Now back with worsening dyspnea and productive cough, noted with hypoxia which improved with nasal cannula.   CXR with nonspecific airspace opacity at R lung base, small pleural effusion  Repeat COVID-19 PCR negative  S/p vancomycin and pip-tazo in the ER  Continued on pip-tazo  CTA chest with b/l upper lobe groundglass/airspace consolidation with b/l pleural effusions and underlying compressive atelectasis- may be secondary to pulmonary edema, cannot exclude superimposed infection.  Blood cultures from 12/14 negative   Transferred to ICU, feels better on BiPAP, afebrile    Send for sputum culture   Legionella and S. pneumo urine antigens and MRSA screen ordered  Continue on pip-tazo

## 2020-12-19 NOTE — DIETITIAN INITIAL EVALUATION ADULT. - PROBLEM SELECTOR PLAN 2
no hx of afib. EKG here revealed Afib w/ RVR at 147 bpm  - Hemodynamically stable. /80  - Hr now 139 s/p Cardizem 10mg IVP x3 in ED  - started on cardizem gtt at 5mg/hr will titrate to 10mg/hr if needed.  - continue routine hemodynamic monitoring  - tele monitoring  - ChadsVasc score 2, moderate-high risk. Will give one time full dose lovenox. Further anticoagulation pending cardio recs.  - [facility] consulted. f/u recs

## 2020-12-19 NOTE — PROGRESS NOTE ADULT - PROBLEM SELECTOR PLAN 2
no hx of afib. EKG here revealed Afib w/ RVR at 147 bpm, likely 2/2 CAP vs acute decompensated heart failure.  - Hemodynamically stable. /80  - Hr now 139 s/p Cardizem 10mg IVP x3 in ED  - started on cardizem gtt at 5mg/hr will titrated to 20mg/hr  s/p lopressor 5mg IV x1  - continue routine hemodynamic monitoring  - tele monitoring  - ChadsVasc score 2, moderate-high risk.   -Full dose anticoagulation lovenox 80 mg BID  - [facility] consulted. recs appreciated  - start lopressor 25 mg PO BID while inpatient. no hx of afib. EKG here revealed Afib w/ persistent RVR up to 147 bpm, likely 2/2 CAP vs acute decompensated heart failure.  -ICU care  - BPs stable.  - s/p Cardizem 10mg IVP x3 in ED  - started on cardizem gtt at 5mg/hr and titrated to max 20mg/hr  - s/p lopressor 5mg IV x1  - continue routine hemodynamic monitoring  - tele monitoring  -ChadsVasc score 2, moderate-high risk.   -Full dose anticoagulation lovenox 80 mg BID  -[facility] consulted. recs appreciated  - On home metoprolol succ 25 mg PO qd. Continue lopressor 25 mg PO BID while inpatient. no hx of afib. EKG here revealed Afib w/ persistent RVR up to 147 bpm, likely 2/2 CAP vs acute decompensated heart failure.  -ICU care  -BPs stable.  -s/p Cardizem 10mg IVP x3 in ED  -started on cardizem gtt at 5mg/hr and titrated to max 20mg/hr  -s/p lopressor 5mg IV x1  - continue routine hemodynamic monitoring  - tele monitoring  -ChadsVasc score 2, moderate-high risk.   -Full dose anticoagulation lovenox 80 mg BID  -[facility] consulted. recs appreciated  -On home metoprolol succ 25 mg PO qd. Continue lopressor 25 mg PO BID while inpatient.  -f/u echo

## 2020-12-19 NOTE — PROGRESS NOTE ADULT - PROBLEM SELECTOR PLAN 4
EKG showed afib with RVR, likely due to CAP and decompensated CHF, has a murmur on exam. Started on cardizem, lasix. CTA chest as above. TTE pending. Cardiology following.

## 2020-12-19 NOTE — PROGRESS NOTE ADULT - SUBJECTIVE AND OBJECTIVE BOX
Pt admitted to ICU on 12/18 for acute hypoxic respiratory failure. Pt also found to be in new onset afib with persistent RVR on admission. respiratory status improved on BiPAP. For Afib, pt is on on dilt gtt, lovenox AC. Pt is being diuresed with Lasix 40 mg IV BID. renal function remains normal. NPO until respiratory status improves Continues zosyn for failed outpatient CAP treatment will follow up pending Cx data Patient is a 65y old  Male who presents with a chief complaint of Aspiration PNA (19 Dec 2020 09:54)      INTERVAL HPI/OVERNIGHT EVENTS: Pt admitted to ICU on 12/18 for acute hypoxic respiratory failure 2/2 aspiration pna. Pt also found to be in new onset afib with persistent RVR on admission. Respiratory status improved on BiPAP. For Afib, pt is on on dilt gtt, lovenox AC. Pt is being diuresed with Lasix 40 mg IV BID. renal function remains normal. NPO until respiratory status improves Continues zosyn for failed outpatient CAP treatment will follow up pending sputum Cx data.      T(C): 37.1 (12-18-20 @ 18:00), Max: 37.1 (12-18-20 @ 18:00)  HR: 86 (12-19-20 @ 10:50) (81 - 141)  BP: 130/82 (12-19-20 @ 07:00) (95/61 - 130/82)  RR: 43 (12-19-20 @ 07:00) (20 - 43)  SpO2: 93% (12-19-20 @ 10:50) (85% - 99%)  Wt(kg): --  I&O's Summary    18 Dec 2020 07:01  -  19 Dec 2020 07:00  --------------------------------------------------------  IN: 460 mL / OUT: 800 mL / NET: -340 mL        LABS:                        13.0   12.48 )-----------( 246      ( 19 Dec 2020 07:05 )             37.9     12-19    135  |  97  |  18  ----------------------------<  113<H>  4.0   |  28  |  0.91    Ca    8.2<L>      19 Dec 2020 07:05  Phos  3.3     12-19  Mg     2.4     12-19    TPro  6.5  /  Alb  2.8<L>  /  TBili  1.1  /  DBili  x   /  AST  15  /  ALT  28  /  AlkPhos  50  12-18    PT/INR - ( 18 Dec 2020 01:23 )   PT: 14.2 sec;   INR: 1.23 ratio         PTT - ( 18 Dec 2020 01:23 )  PTT:24.7 sec    CAPILLARY BLOOD GLUCOSE                MEDICATIONS  (STANDING):  atorvastatin 40 milliGRAM(s) Oral at bedtime  chlorhexidine 2% Cloths 1 Application(s) Topical <User Schedule>  diltiazem Infusion 15 mG/Hr (15 mL/Hr) IV Continuous <Continuous>  enoxaparin Injectable 80 milliGRAM(s) SubCutaneous two times a day  furosemide   Injectable 40 milliGRAM(s) IV Push two times a day  metoprolol tartrate 25 milliGRAM(s) Oral every 6 hours  piperacillin/tazobactam IVPB.. 3.375 Gram(s) IV Intermittent every 8 hours    MEDICATIONS  (PRN):      REVIEW OF SYSTEMS:  CONSTITUTIONAL: No weakness, fevers or chills  RESPIRATORY: No cough, wheezing, denies hemoptysis; +ESTEVEZ  CARDIOVASCULAR: No chest pain, + palpitations  GASTROINTESTINAL: No abdominal or epigastric pain. No nausea, vomiting, or hematemesis; No diarrhea or constipation. No melena or hematochezia.  GENITOURINARY: No dysuria, frequency or hematuria  NEUROLOGICAL: No numbness or weakness  SKIN: No itching, burning, rashes, or lesions   All other review of systems is negative unless indicated above.    RADIOLOGY & ADDITIONAL TESTS:    Imaging Personally Reviewed:  [ x] YES  [ ] NO    Consultant(s) Notes Reviewed:  [x ] YES  [ ] NO    PHYSICAL EXAM:  GENERAL: patient appears well, no acute distress, appropriately interactive.  EYES: sclera clear, no exudates, PERRL  LUNGS: Increased work of breathing. diminished breath sounds over R midlung region and R lung base, +crackles right lung base  HEART: soft S1/S2, tachy rate and irregular rhythm, + murmur, no lower extremity edema  GASTROINTESTINAL: abdomen is soft, nontender, nondistended, normoactive bowel sounds  INTEGUMENT: good skin turgor, warm skin, appears well perfused  MUSCULOSKELETAL: no clubbing or cyanosis, no obvious deformity  NEUROLOGIC: awake, alert, oriented x3, good muscle tone in all 4 extremities  HEME/LYMPH: no obvious ecchymosis or petechiae    Care Discussed with Consultants/Other Providers [x ] YES  [ ] NO

## 2020-12-20 VITALS — RESPIRATION RATE: 31 BRPM | HEART RATE: 101 BPM

## 2020-12-20 LAB
ALBUMIN SERPL ELPH-MCNC: 2.2 G/DL — LOW (ref 3.3–5)
ALP SERPL-CCNC: 46 U/L — SIGNIFICANT CHANGE UP (ref 40–120)
ALT FLD-CCNC: 27 U/L — SIGNIFICANT CHANGE UP (ref 12–78)
ANION GAP SERPL CALC-SCNC: 8 MMOL/L — SIGNIFICANT CHANGE UP (ref 5–17)
AST SERPL-CCNC: 20 U/L — SIGNIFICANT CHANGE UP (ref 15–37)
BASOPHILS # BLD AUTO: 0.02 K/UL — SIGNIFICANT CHANGE UP (ref 0–0.2)
BASOPHILS NFR BLD AUTO: 0.2 % — SIGNIFICANT CHANGE UP (ref 0–2)
BILIRUB SERPL-MCNC: 1.1 MG/DL — SIGNIFICANT CHANGE UP (ref 0.2–1.2)
BUN SERPL-MCNC: 24 MG/DL — HIGH (ref 7–23)
CALCIUM SERPL-MCNC: 8 MG/DL — LOW (ref 8.5–10.1)
CHLORIDE SERPL-SCNC: 98 MMOL/L — SIGNIFICANT CHANGE UP (ref 96–108)
CO2 SERPL-SCNC: 32 MMOL/L — HIGH (ref 22–31)
CREAT SERPL-MCNC: 1 MG/DL — SIGNIFICANT CHANGE UP (ref 0.5–1.3)
EOSINOPHIL # BLD AUTO: 0.03 K/UL — SIGNIFICANT CHANGE UP (ref 0–0.5)
EOSINOPHIL NFR BLD AUTO: 0.3 % — SIGNIFICANT CHANGE UP (ref 0–6)
GLUCOSE SERPL-MCNC: 109 MG/DL — HIGH (ref 70–99)
HCT VFR BLD CALC: 37.3 % — LOW (ref 39–50)
HGB BLD-MCNC: 12.9 G/DL — LOW (ref 13–17)
IMM GRANULOCYTES NFR BLD AUTO: 0.5 % — SIGNIFICANT CHANGE UP (ref 0–1.5)
LYMPHOCYTES # BLD AUTO: 0.62 K/UL — LOW (ref 1–3.3)
LYMPHOCYTES # BLD AUTO: 5.7 % — LOW (ref 13–44)
MAGNESIUM SERPL-MCNC: 2.1 MG/DL — SIGNIFICANT CHANGE UP (ref 1.6–2.6)
MCHC RBC-ENTMCNC: 29.1 PG — SIGNIFICANT CHANGE UP (ref 27–34)
MCHC RBC-ENTMCNC: 34.6 GM/DL — SIGNIFICANT CHANGE UP (ref 32–36)
MCV RBC AUTO: 84 FL — SIGNIFICANT CHANGE UP (ref 80–100)
MONOCYTES # BLD AUTO: 1.35 K/UL — HIGH (ref 0–0.9)
MONOCYTES NFR BLD AUTO: 12.3 % — SIGNIFICANT CHANGE UP (ref 2–14)
NEUTROPHILS # BLD AUTO: 8.87 K/UL — HIGH (ref 1.8–7.4)
NEUTROPHILS NFR BLD AUTO: 81 % — HIGH (ref 43–77)
NRBC # BLD: 0 /100 WBCS — SIGNIFICANT CHANGE UP (ref 0–0)
PHOSPHATE SERPL-MCNC: 2.8 MG/DL — SIGNIFICANT CHANGE UP (ref 2.5–4.5)
PLATELET # BLD AUTO: 272 K/UL — SIGNIFICANT CHANGE UP (ref 150–400)
POTASSIUM SERPL-MCNC: 3.3 MMOL/L — LOW (ref 3.5–5.3)
POTASSIUM SERPL-SCNC: 3.3 MMOL/L — LOW (ref 3.5–5.3)
PROT SERPL-MCNC: 6 G/DL — SIGNIFICANT CHANGE UP (ref 6–8.3)
RBC # BLD: 4.44 M/UL — SIGNIFICANT CHANGE UP (ref 4.2–5.8)
RBC # FLD: 12.8 % — SIGNIFICANT CHANGE UP (ref 10.3–14.5)
SODIUM SERPL-SCNC: 138 MMOL/L — SIGNIFICANT CHANGE UP (ref 135–145)
T3 SERPL-MCNC: 78 NG/DL — LOW (ref 80–200)
T4 AB SER-ACNC: 8.7 UG/DL — SIGNIFICANT CHANGE UP (ref 4.6–12)
TSH SERPL-MCNC: 0.25 UIU/ML — LOW (ref 0.36–3.74)
WBC # BLD: 10.95 K/UL — HIGH (ref 3.8–10.5)
WBC # FLD AUTO: 10.95 K/UL — HIGH (ref 3.8–10.5)

## 2020-12-20 PROCEDURE — U0003: CPT

## 2020-12-20 PROCEDURE — 84443 ASSAY THYROID STIM HORMONE: CPT

## 2020-12-20 PROCEDURE — 85027 COMPLETE CBC AUTOMATED: CPT

## 2020-12-20 PROCEDURE — 86769 SARS-COV-2 COVID-19 ANTIBODY: CPT

## 2020-12-20 PROCEDURE — 84436 ASSAY OF TOTAL THYROXINE: CPT

## 2020-12-20 PROCEDURE — 92610 EVALUATE SWALLOWING FUNCTION: CPT

## 2020-12-20 PROCEDURE — 84100 ASSAY OF PHOSPHORUS: CPT

## 2020-12-20 PROCEDURE — 82803 BLOOD GASES ANY COMBINATION: CPT

## 2020-12-20 PROCEDURE — 83735 ASSAY OF MAGNESIUM: CPT

## 2020-12-20 PROCEDURE — 99291 CRITICAL CARE FIRST HOUR: CPT

## 2020-12-20 PROCEDURE — 71275 CT ANGIOGRAPHY CHEST: CPT

## 2020-12-20 PROCEDURE — 85025 COMPLETE CBC W/AUTO DIFF WBC: CPT

## 2020-12-20 PROCEDURE — 96361 HYDRATE IV INFUSION ADD-ON: CPT

## 2020-12-20 PROCEDURE — 82728 ASSAY OF FERRITIN: CPT

## 2020-12-20 PROCEDURE — 80053 COMPREHEN METABOLIC PANEL: CPT

## 2020-12-20 PROCEDURE — 36415 COLL VENOUS BLD VENIPUNCTURE: CPT

## 2020-12-20 PROCEDURE — 71045 X-RAY EXAM CHEST 1 VIEW: CPT

## 2020-12-20 PROCEDURE — 85730 THROMBOPLASTIN TIME PARTIAL: CPT

## 2020-12-20 PROCEDURE — 85379 FIBRIN DEGRADATION QUANT: CPT

## 2020-12-20 PROCEDURE — 83605 ASSAY OF LACTIC ACID: CPT

## 2020-12-20 PROCEDURE — 84480 ASSAY TRIIODOTHYRONINE (T3): CPT

## 2020-12-20 PROCEDURE — 85610 PROTHROMBIN TIME: CPT

## 2020-12-20 PROCEDURE — 86803 HEPATITIS C AB TEST: CPT

## 2020-12-20 PROCEDURE — 96374 THER/PROPH/DIAG INJ IV PUSH: CPT

## 2020-12-20 PROCEDURE — 83036 HEMOGLOBIN GLYCOSYLATED A1C: CPT

## 2020-12-20 PROCEDURE — 84145 PROCALCITONIN (PCT): CPT

## 2020-12-20 PROCEDURE — 93306 TTE W/DOPPLER COMPLETE: CPT

## 2020-12-20 PROCEDURE — 94660 CPAP INITIATION&MGMT: CPT

## 2020-12-20 PROCEDURE — 83880 ASSAY OF NATRIURETIC PEPTIDE: CPT

## 2020-12-20 PROCEDURE — 86140 C-REACTIVE PROTEIN: CPT

## 2020-12-20 PROCEDURE — 99285 EMERGENCY DEPT VISIT HI MDM: CPT | Mod: 25

## 2020-12-20 PROCEDURE — 80048 BASIC METABOLIC PNL TOTAL CA: CPT

## 2020-12-20 RX ORDER — ENOXAPARIN SODIUM 100 MG/ML
80 INJECTION SUBCUTANEOUS EVERY 12 HOURS
Refills: 0 | Status: DISCONTINUED | OUTPATIENT
Start: 2020-12-20 | End: 2020-12-20

## 2020-12-20 RX ORDER — ATORVASTATIN CALCIUM 80 MG/1
1 TABLET, FILM COATED ORAL
Qty: 0 | Refills: 0 | DISCHARGE
Start: 2020-12-20

## 2020-12-20 RX ORDER — PIPERACILLIN AND TAZOBACTAM 4; .5 G/20ML; G/20ML
3.38 INJECTION, POWDER, LYOPHILIZED, FOR SOLUTION INTRAVENOUS
Qty: 0 | Refills: 0 | DISCHARGE
Start: 2020-12-20

## 2020-12-20 RX ORDER — AMLODIPINE BESYLATE 2.5 MG/1
1 TABLET ORAL
Qty: 0 | Refills: 0 | DISCHARGE

## 2020-12-20 RX ORDER — FUROSEMIDE 40 MG
40 TABLET ORAL
Qty: 0 | Refills: 0 | DISCHARGE
Start: 2020-12-20

## 2020-12-20 RX ORDER — POTASSIUM CHLORIDE 20 MEQ
40 PACKET (EA) ORAL EVERY 4 HOURS
Refills: 0 | Status: DISCONTINUED | OUTPATIENT
Start: 2020-12-20 | End: 2020-12-20

## 2020-12-20 RX ORDER — METOPROLOL TARTRATE 50 MG
1 TABLET ORAL
Qty: 0 | Refills: 0 | DISCHARGE
Start: 2020-12-20

## 2020-12-20 RX ORDER — ENOXAPARIN SODIUM 100 MG/ML
80 INJECTION SUBCUTANEOUS
Qty: 0 | Refills: 0 | DISCHARGE
Start: 2020-12-20

## 2020-12-20 RX ORDER — ATORVASTATIN CALCIUM 80 MG/1
1 TABLET, FILM COATED ORAL
Qty: 0 | Refills: 0 | DISCHARGE

## 2020-12-20 RX ORDER — DILTIAZEM HCL 120 MG
15 CAPSULE, EXT RELEASE 24 HR ORAL
Qty: 0 | Refills: 0 | DISCHARGE
Start: 2020-12-20

## 2020-12-20 RX ORDER — METOPROLOL TARTRATE 50 MG
1 TABLET ORAL
Qty: 0 | Refills: 0 | DISCHARGE

## 2020-12-20 RX ORDER — TRIAMTERENE/HYDROCHLOROTHIAZID 75 MG-50MG
1 TABLET ORAL
Qty: 0 | Refills: 0 | DISCHARGE

## 2020-12-20 RX ADMIN — CHLORHEXIDINE GLUCONATE 1 APPLICATION(S): 213 SOLUTION TOPICAL at 05:42

## 2020-12-20 RX ADMIN — ENOXAPARIN SODIUM 80 MILLIGRAM(S): 100 INJECTION SUBCUTANEOUS at 10:14

## 2020-12-20 RX ADMIN — Medication 40 MILLIEQUIVALENT(S): at 12:18

## 2020-12-20 RX ADMIN — Medication 25 MILLIGRAM(S): at 05:42

## 2020-12-20 RX ADMIN — Medication 40 MILLIGRAM(S): at 05:42

## 2020-12-20 RX ADMIN — PIPERACILLIN AND TAZOBACTAM 25 GRAM(S): 4; .5 INJECTION, POWDER, LYOPHILIZED, FOR SOLUTION INTRAVENOUS at 05:42

## 2020-12-20 RX ADMIN — Medication 15 MG/HR: at 05:43

## 2020-12-20 RX ADMIN — Medication 25 MILLIGRAM(S): at 12:17

## 2020-12-20 NOTE — PROGRESS NOTE ADULT - PROBLEM SELECTOR PLAN 1
due to right sided pneumonia and pleural effusion, new onset afib and possible CHF exacerbation, severe MR  Was in the ER 4 days PTA, COVID and RVP negative - d/c'd on augmentin for CAP. Now back with worsening dyspnea and productive cough with blood tinged sputum, noted with hypoxia. Remains on BiPAP.  CXR with nonspecific airspace opacity at R lung base, small pleural effusion  Repeat COVID-19 PCR negative  S/p vancomycin and pip-tazo in the ER  CTA chest with b/l upper lobe groundglass/airspace consolidation with b/l pleural effusions and underlying compressive atelectasis- may be secondary to pulmonary edema, cannot exclude superimposed infection.  Blood cultures from 12/14 negative   Transferred to ICU, feels better on BiPAP, afebrile  TTE prelim read by cardiology with at least mod MR, likely severe with either partial flail or ruptured chordae of post leafleft, official read pending    Send for sputum culture, ordered but not sent  Send Legionella and S. pneumo urine antigens and MRSA screen ordered  Continue on pip-tazo  Patient awaiting transfer to [facility] for possible intervention for MR

## 2020-12-20 NOTE — PROGRESS NOTE ADULT - REASON FOR ADMISSION
Aspiration PNA

## 2020-12-20 NOTE — PROGRESS NOTE ADULT - PROBLEM SELECTOR PLAN 1
Acute hypoxic respiratory failure likely 2/2 aspiration PNA likely 2/2 aspiration PNA given right side pleural effusion. Patient was seen in [facility] ED 4 days ago, covid pcr negative at that time. Now returning with worsening symptoms and hypoxia.   -contine zosyn  - COVID negative  -f/u sputum cx  - ID, Dr. JOE Palomino consulted, recs appreciated  - Pulpamela Espinosa consulted, recs appreciated.

## 2020-12-20 NOTE — PROGRESS NOTE ADULT - PROBLEM SELECTOR PROBLEM 4
Atrial fibrillation with rapid ventricular response
HLD (hyperlipidemia)
HLD (hyperlipidemia)
Atrial fibrillation with rapid ventricular response
HLD (hyperlipidemia)

## 2020-12-20 NOTE — PROGRESS NOTE ADULT - ASSESSMENT
65 M history of Myasthenia gravis, HTN, left foot drop presents with worsening shortness of breath. Recently seen in [facility] ED 4 days ago for PNA. Admitted for acute hypoxic respiratory failure 2/2 PNA vs decompensated HF requiring BiPAP. Also found to be in new onset Afib w/ RVR at 149.    Neuro - no active issues, A&Ox3  CV - hemodynamically stable, a-fib with RVR-rate controlled in 90s with diltiazem gtt at 15 mg/hr and lopressor 25 mg q6h, full AC with lovenox; HTN-hold home anti-hypertensives for now; CHF-continue IV Lasix 40 mg BID, TTE showing MVP with severe MR. Patient's outpatient cardiologist Dr. Lei Najera reports echo from last year with MVP and mild MR, possible chordae tendinae rupture, will discuss with cardiologist, may need to be transferred for possible procedure  Pulm - PNA vs. CHF exacerbation-cont BiPAP, wean as tolerated, if unable to wean off NIPPV, consideration for drainage of R effusion given size and attempt to optimize pulmonary status  GI -  NPO except meds while on BiPAP  Renal - Cr stable, negative fluid balance as tolerates, monitor I/Os  Heme - Full dose AC with tx dose lovenox, SCDs, no evidence of bleeding.  ID - Cont empiric IV abx with Zosyn as per ID, f/u legionella  Endo - TSH .3, f/u AM total T3/T4, BS stable, f/u HbA1c 65 M history of Myasthenia gravis, HTN, left foot drop presents with worsening shortness of breath. Recently seen in [facility] ED 4 days ago for PNA. Admitted for acute hypoxic respiratory failure 2/2 PNA vs decompensated HF requiring BiPAP. Also found to be in new onset Afib w/ RVR at 149.    Neuro - no active issues, A&Ox3  CV - hemodynamically stable, a-fib with RVR-rate controlled in 90s with diltiazem gtt at 15 mg/hr and lopressor 25 mg q6h, full AC with lovenox; HTN-hold home anti-hypertensives for now; CHF-continue IV Lasix 40 mg BID, TTE prelim read showing at least mod MR, likely severe with either partial flail or ruptured chordae of posterior leaflet. Family wishes to be transferred to [place]. Accepted by [facility] for transfer today.  Pulm - PNA vs. CHF exacerbation-cont BiPAP, wean as tolerated, continue IV diuresis  GI -  NPO except meds while on BiPAP  Renal - Cr stable, negative fluid balance as tolerates, monitor I/Os, potassium repleted  Heme - Full dose AC with tx dose lovenox, SCDs, no evidence of bleeding.  ID - Cont empiric IV abx with Zosyn as per ID, f/u legionella  Endo - TSH .3, f/u AM total T3/T4, BS stable, HbA1c 4.9 65 M history of Myasthenia gravis, HTN, left foot drop presents with worsening shortness of breath. Recently seen in [facility] ED 4 days ago for PNA. Admitted for acute hypoxic respiratory failure 2/2 PNA vs decompensated HF requiring BiPAP. Also found to be in new onset Afib w/ RVR at 149.    Neuro - no active issues, A&Ox3  CV - hemodynamically stable, a-fib with RVR-rate controlled in 90s with diltiazem gtt at 15 mg/hr and lopressor 25 mg q6h, full AC with lovenox; HTN-hold home anti-hypertensives for now; CHF-continue IV Lasix 40 mg BID, TTE prelim read showing at least mod MR, likely severe with either partial flail or ruptured chordae of posterior leaflet. Family wishes to be transferred to [place]. Accepted by [place] [facility] for transfer today.  Pulm - PNA vs. CHF exacerbation-cont BiPAP, wean as tolerated, continue IV diuresis  GI -  NPO except meds while on BiPAP  Renal - Cr stable, negative fluid balance as tolerates, monitor I/Os, potassium repleted  Heme - Full dose AC with tx dose lovenox, SCDs, no evidence of bleeding.  ID - Cont empiric IV abx with Zosyn as per ID, f/u legionella  Endo - TSH .3, total T3/T4 pending, BS stable, HbA1c 4.9

## 2020-12-20 NOTE — PROGRESS NOTE ADULT - PROVIDER SPECIALTY LIST ADULT
Cardiology
Critical Care
Pulmonology
Cardiology
Critical Care
Critical Care
Infectious Disease
Internal Medicine
Pulmonology
Internal Medicine
Infectious Disease
Internal Medicine

## 2020-12-20 NOTE — PROGRESS NOTE ADULT - SUBJECTIVE AND OBJECTIVE BOX
***charting in progress***  Patient is a 65y old  Male who presents with a chief complaint of Aspiration PNA (20 Dec 2020 07:06)    Interval events:     Review of Systems:  Constitutional: no fever, chills, fatigue  Neuro: no headache, numbness, weakness  Resp: no cough, wheezing, shortness of breath  CVS: no chest pain, palpitations, leg swelling  GI: no abdominal pain, nausea, vomiting, diarrhea   : no dysuria, frequency, incontinence  Skin: no itching, burning, rashes, or lesions   Msk: no joint pain or swelling  Psych: no depression, anxiety    T(F): 98.8 (12-20-20 @ 04:39), Max: 99 (12-19-20 @ 20:31)  HR: 109 (12-20-20 @ 07:00) (85 - 136)  BP: 103/60 (12-20-20 @ 07:00) (88/58 - 103/70)  RR: 29 (12-20-20 @ 07:00) (27 - 45)  SpO2: 96% (12-20-20 @ 07:00) (88% - 97%)  Wt(kg): --        CAPILLARY BLOOD GLUCOSE        I&O's Summary    19 Dec 2020 07:01  -  20 Dec 2020 07:00  --------------------------------------------------------  IN: 705 mL / OUT: 1700 mL / NET: -995 mL        Physical Exam:     Gen:  Neuro:  HEENT:  CV:  Pulm:  GI:  Ext:  Skin:    Meds:  MEDICATIONS  (STANDING):  atorvastatin 40 milliGRAM(s) Oral at bedtime  chlorhexidine 2% Cloths 1 Application(s) Topical <User Schedule>  diltiazem Infusion 15 mG/Hr (15 mL/Hr) IV Continuous <Continuous>  furosemide   Injectable 40 milliGRAM(s) IV Push two times a day  metoprolol tartrate 25 milliGRAM(s) Oral every 6 hours  piperacillin/tazobactam IVPB.. 3.375 Gram(s) IV Intermittent every 8 hours    MEDICATIONS  (PRN):                            13.0   12.48 )-----------( 246      ( 19 Dec 2020 07:05 )             37.9       12-19    135  |  97  |  18  ----------------------------<  113<H>  4.0   |  28  |  0.91    Ca    8.2<L>      19 Dec 2020 07:05  Phos  3.3     12-19  Mg     2.4     12-19                          Radiology: ***  Bedside Lung U/S: ***  Bedside Cardiac U/S: ***    CENTRAL LINE: Y/N   DATE INSERTED:   REMOVE: Y/N  CHATTERJEE: Y/N      DATE INSERTED:        REMOVE: Y/N  A-LINE: Y/N     DATE INSERTED:              REMOVE: Y/N    GLOBAL ISSUE/BEST PRACTICE:  Analgesia:  Sedation:  HOB elevation: yes  Stress ulcer prophylaxis:  VTE prophylaxis:  Glycemic control:  Nutrition:    CODE STATUS: ***  [facility] discussion: Y       Patient is a 65y old  Male who presents with a chief complaint of Aspiration PNA (20 Dec 2020 07:06)    Interval events: Patient seen and examined at bedside. No acute events overnight. Was able to be off BiPAP for 2 hours but had increased work of breathing and dyspnea. This morning, patient states that he feels about the same in terms of his breathing. Otherwise, no acute complaints.    Review of Systems:  Constitutional: no fever, chills  Resp: admits cough, shortness of breath  CVS: no chest pain, palpitations  GI: no abdominal pain, nausea, vomiting  Extremities: no swelling      T(F): 98.8 (12-20-20 @ 04:39), Max: 99 (12-19-20 @ 20:31)  HR: 109 (12-20-20 @ 07:00) (85 - 136)  BP: 103/60 (12-20-20 @ 07:00) (88/58 - 103/70)  RR: 29 (12-20-20 @ 07:00) (27 - 45)  SpO2: 96% (12-20-20 @ 07:00) (88% - 97%)  Wt(kg): --        CAPILLARY BLOOD GLUCOSE        I&O's Summary    19 Dec 2020 07:01  -  20 Dec 2020 07:00  --------------------------------------------------------  IN: 705 mL / OUT: 1700 mL / NET: -995 mL        Physical Exam:     Gen: frail male in NAD  Neuro: A&Ox3, answers questions appropriately  HEENT: NC/AT  CV: irregularly irregular, +S1/S2, systolic murmur  Pulm: decreased breath sounds b/l  GI: soft, NT/ND  Ext: no c/c/e  Skin: warm, dry    Meds:  MEDICATIONS  (STANDING):  atorvastatin 40 milliGRAM(s) Oral at bedtime  chlorhexidine 2% Cloths 1 Application(s) Topical <User Schedule>  diltiazem Infusion 15 mG/Hr (15 mL/Hr) IV Continuous <Continuous>  furosemide   Injectable 40 milliGRAM(s) IV Push two times a day  metoprolol tartrate 25 milliGRAM(s) Oral every 6 hours  piperacillin/tazobactam IVPB.. 3.375 Gram(s) IV Intermittent every 8 hours    MEDICATIONS  (PRN):                            13.0   12.48 )-----------( 246      ( 19 Dec 2020 07:05 )             37.9       12-19    135  |  97  |  18  ----------------------------<  113<H>  4.0   |  28  |  0.91    Ca    8.2<L>      19 Dec 2020 07:05  Phos  3.3     12-19  Mg     2.4     12-19                          Radiology: none in past 24 hours    CENTRAL LINE: N    CHATTERJEE: Y      DATE INSERTED: 12/19          A-LINE: N        GLOBAL ISSUE/BEST PRACTICE:  Analgesia: No  Sedation: No  HOB elevation: yes  Stress ulcer prophylaxis: No  VTE prophylaxis: Yes  Glycemic control: Yes  Nutrition: NPO except meds    CODE STATUS: full code  GO discussion: Y       Patient is a 65y old  Male who presents with a chief complaint of Aspiration PNA (20 Dec 2020 07:06)    Interval events: Patient seen and examined at bedside. No acute events overnight. Was able to be off BiPAP for 2 hours but had increased work of breathing and dyspnea. This morning, patient states that he feels about the same in terms of his breathing. Otherwise, no acute complaints.  echo yesterday prelim shows MV posterior leaflet flair v ruptured cordae and mod-severe MR.  Family requested transfer to [place] and he was accepted this am.    Review of Systems:  Constitutional: no fever, chills  Resp: admits cough, shortness of breath  CVS: no chest pain, palpitations  GI: no abdominal pain, nausea, vomiting  Extremities: no swelling      T(F): 98.8 (12-20-20 @ 04:39), Max: 99 (12-19-20 @ 20:31)  HR: 109 (12-20-20 @ 07:00) (85 - 136)  BP: 103/60 (12-20-20 @ 07:00) (88/58 - 103/70)  RR: 29 (12-20-20 @ 07:00) (27 - 45)  SpO2: 96% (12-20-20 @ 07:00) (88% - 97%)  Wt(kg): --        CAPILLARY BLOOD GLUCOSE        I&O's Summary    19 Dec 2020 07:01  -  20 Dec 2020 07:00  --------------------------------------------------------  IN: 705 mL / OUT: 1700 mL / NET: -995 mL        Physical Exam:     Gen: frail male in NAD  Neuro: A&Ox3, answers questions appropriately  HEENT: NC/AT  CV: irregularly irregular, +S1/S2, 4/6 systolic murmur at apex  Pulm: decreased breath sounds b/l  GI: soft, NT/ND  Ext: no c/c/e  Skin: warm, dry    Meds:  MEDICATIONS  (STANDING):  atorvastatin 40 milliGRAM(s) Oral at bedtime  chlorhexidine 2% Cloths 1 Application(s) Topical <User Schedule>  diltiazem Infusion 15 mG/Hr (15 mL/Hr) IV Continuous <Continuous>  furosemide   Injectable 40 milliGRAM(s) IV Push two times a day  metoprolol tartrate 25 milliGRAM(s) Oral every 6 hours  piperacillin/tazobactam IVPB.. 3.375 Gram(s) IV Intermittent every 8 hours    MEDICATIONS  (PRN):                            13.0   12.48 )-----------( 246      ( 19 Dec 2020 07:05 )             37.9       12-19    135  |  97  |  18  ----------------------------<  113<H>  4.0   |  28  |  0.91    Ca    8.2<L>      19 Dec 2020 07:05  Phos  3.3     12-19  Mg     2.4     12-19      Radiology: none in past 24 hours  echo 12/19 perlim per cardiology: normal LV, posterior leaflet partial flair or ruptured cordae, at least moderate but probably severe MR    CENTRAL LINE: N    CHATTERJEE: Y      DATE INSERTED: 12/19          A-LINE: N        GLOBAL ISSUE/BEST PRACTICE:  Analgesia: No  Sedation: No  HOB elevation: yes  Stress ulcer prophylaxis: No  VTE prophylaxis: Yes  Glycemic control: Yes  Nutrition: NPO except meds    CODE STATUS: full code

## 2020-12-20 NOTE — PROGRESS NOTE ADULT - SUBJECTIVE AND OBJECTIVE BOX
Patient is a 65y old  Male who presents with a chief complaint of Aspiration PNA (20 Dec 2020 10:09)      INTERVAL HPI/OVERNIGHT EVENTS:  Awaiting transfer to [place] for mitral valve repair.         T(C): 37.1 (12-20-20 @ 04:39), Max: 37.2 (12-19-20 @ 20:31)  HR: 108 (12-20-20 @ 09:00) (87 - 136)  BP: 102/63 (12-20-20 @ 08:00) (88/58 - 103/70)  RR: 36 (12-20-20 @ 09:00) (26 - 45)  SpO2: 95% (12-20-20 @ 09:00) (88% - 97%)  Wt(kg): --  I&O's Summary    19 Dec 2020 07:01  -  20 Dec 2020 07:00  --------------------------------------------------------  IN: 705 mL / OUT: 1700 mL / NET: -995 mL        LABS:                        12.9   10.95 )-----------( 272      ( 20 Dec 2020 09:06 )             37.3     12-20    138  |  98  |  24<H>  ----------------------------<  109<H>  3.3<L>   |  32<H>  |  1.00    Ca    8.0<L>      20 Dec 2020 09:06  Phos  2.8     12-20  Mg     2.1     12-20    TPro  6.0  /  Alb  2.2<L>  /  TBili  1.1  /  DBili  x   /  AST  20  /  ALT  27  /  AlkPhos  46  12-20        CAPILLARY BLOOD GLUCOSE                MEDICATIONS  (STANDING):  atorvastatin 40 milliGRAM(s) Oral at bedtime  chlorhexidine 2% Cloths 1 Application(s) Topical <User Schedule>  diltiazem Infusion 15 mG/Hr (15 mL/Hr) IV Continuous <Continuous>  enoxaparin Injectable 80 milliGRAM(s) SubCutaneous every 12 hours  furosemide   Injectable 40 milliGRAM(s) IV Push two times a day  metoprolol tartrate 25 milliGRAM(s) Oral every 6 hours  piperacillin/tazobactam IVPB.. 3.375 Gram(s) IV Intermittent every 8 hours  potassium chloride    Tablet ER 40 milliEquivalent(s) Oral every 4 hours    MEDICATIONS  (PRN):      REVIEW OF SYSTEMS:  Constitutional: no fever, chills  Resp: admits cough, shortness of breath  CVS: no chest pain, palpitations  GI: no abdominal pain, nausea, vomiting        RADIOLOGY & ADDITIONAL TESTS:    Imaging Personally Reviewed:  [ ] YES  [ ] NO    Consultant(s) Notes Reviewed:  [ ] YES  [ ] NO    PHYSICAL EXAM:  Gen: frail male in NAD  Neuro: A&Ox3, answers questions appropriately  HEENT: NC/AT  CV: irregularly irregular, +S1/S2, systolic murmur  Pulm: decreased breath sounds b/l  GI: soft, NT/ND  Ext: no c/c/e  Skin: warm, dry    Care Discussed with Consultants/Other Providers [ ] YES  [ ] NO

## 2020-12-20 NOTE — PROGRESS NOTE ADULT - ASSESSMENT
The patient is a 65 M history of Myasthenia gravis, HTN, left foot drop presents with shortness of breath. Patient was recently seen in [facility] ED 4 days ago for SOB, myalgias and cough. Covid test was negative. Now in Afib    MR  - prelim TTE shows at least mod MR, likely severe with either partial flail or ruptured chordae of posterior leaflet  - plan for transfer to [facility] (pt/family wishes) for possible intervention     A.Fib: Likely 2/2 to CAP, sev MR. CHF exacerbation also contributing. Now on BiPAP  - Patient is in New onset A.fib   - EKG shows A.Fib. No signs of acute ischemic changes.   - Trops negative  - HR improving  - Patient is S/P Cardizem 10mg IV push x2 and was started on Cardizem infusion, initially 5ml/h and later titrated to 10 and then 15ml/h with no break in A.Fib in the ED  - uptitrate Metoprolol tartrate hold perimeters, continue Cardizem infusion at 15ml/hour with hopes to titrate off gtt  - c/w Lasix 40mg IV q12 hours for now  - Continue Lovenox full dose at 80 mg.   - Hold home amlodipine, metoprolol and Triamterene/ HTCZ  for now.   - continue with tele monitoring     CHF: Likely 2/2 to valvular heart disease and exacerbated by PNA  - BNP 4512-->3010. Trend BNP  - strict I&O  - daily weights  - CT chest shows b/l upper lobe airspace consolidation with b/l pleural effusions    HTN  - monitor routine hemodynamics.  - management as above.  - Other cardiovascular workup will depend on clinical course. All other workup per primary team.  - Will follow.

## 2020-12-20 NOTE — PROGRESS NOTE ADULT - SUBJECTIVE AND OBJECTIVE BOX
Date/Time Patient Seen:  		  Referring MD:   Data Reviewed	       Patient is a 65y old  Male who presents with a chief complaint of Aspiration PNA (19 Dec 2020 10:15)      Subjective/HPI     PAST MEDICAL & SURGICAL HISTORY:  History of left foot drop    Murmur, cardiac    Myasthenia gravis    HTN (hypertension)    History of back surgery          Medication list         MEDICATIONS  (STANDING):  atorvastatin 40 milliGRAM(s) Oral at bedtime  chlorhexidine 2% Cloths 1 Application(s) Topical <User Schedule>  diltiazem Infusion 15 mG/Hr (15 mL/Hr) IV Continuous <Continuous>  furosemide   Injectable 40 milliGRAM(s) IV Push two times a day  metoprolol tartrate 25 milliGRAM(s) Oral every 6 hours  piperacillin/tazobactam IVPB.. 3.375 Gram(s) IV Intermittent every 8 hours    MEDICATIONS  (PRN):         Vitals log        ICU Vital Signs Last 24 Hrs  T(C): 37.1 (20 Dec 2020 04:39), Max: 37.2 (19 Dec 2020 20:31)  T(F): 98.8 (20 Dec 2020 04:39), Max: 99 (19 Dec 2020 20:31)  HR: 119 (20 Dec 2020 06:35) (85 - 136)  BP: 100/68 (20 Dec 2020 06:00) (88/58 - 103/70)  BP(mean): 80 (20 Dec 2020 06:00) (67 - 81)  ABP: --  ABP(mean): --  RR: 45 (20 Dec 2020 06:00) (27 - 45)  SpO2: 95% (20 Dec 2020 06:35) (88% - 97%)           Input and Output:  I&O's Detail    19 Dec 2020 07:01  -  20 Dec 2020 07:00  --------------------------------------------------------  IN:    Diltiazem: 345 mL    IV PiggyBack: 300 mL    Oral Fluid: 60 mL  Total IN: 705 mL    OUT:    Voided (mL): 1700 mL  Total OUT: 1700 mL    Total NET: -995 mL          Lab Data                        13.0   12.48 )-----------( 246      ( 19 Dec 2020 07:05 )             37.9     12-19    135  |  97  |  18  ----------------------------<  113<H>  4.0   |  28  |  0.91    Ca    8.2<L>      19 Dec 2020 07:05  Phos  3.3     12-19  Mg     2.4     12-19    TPro  6.5  /  Alb  2.8<L>  /  TBili  1.1  /  DBili  x   /  AST  15  /  ALT  28  /  AlkPhos  50  12-18            Review of Systems	      Objective     Physical Examination    heart s1s2  lung dec BS  abd soft      Pertinent Lab findings & Imaging      [place]:  NO   Adequate UO     I&O's Detail    19 Dec 2020 07:01  -  20 Dec 2020 07:00  --------------------------------------------------------  IN:    Diltiazem: 345 mL    IV PiggyBack: 300 mL    Oral Fluid: 60 mL  Total IN: 705 mL    OUT:    Voided (mL): 1700 mL  Total OUT: 1700 mL    Total NET: -995 mL               Discussed with:     Cultures:	        Radiology

## 2020-12-20 NOTE — PROGRESS NOTE ADULT - SUBJECTIVE AND OBJECTIVE BOX
[facility], Division of Infectious Diseases  JENNIFER Dennison Y. Patel, S. Shah  [phone]  ([phone] - weekdays after 5pm and weekends)    Name: MAGGIE RICHARD  Age/Gender: 65y Male  MRN: [id number]    Interval History:  Patient on BiPAP, feels ok but no significant change in dyspnea, has productive cough with bloody sputum when he takes BiPAP off. Denies fever, chills, chest pain, abd pain, n/v/d, dysuria.   ROS reviewed, pertinent positives and negatives as above.   Notes reviewed. Afebrile. Awaiting transfer to [place] for possible MV repair.     Objective:  Vitals:   TICU Vital Signs Last 24 Hrs  T(C): 37.1 (20 Dec 2020 04:39), Max: 37.2 (19 Dec 2020 20:31)  T(F): 98.8 (20 Dec 2020 04:39), Max: 99 (19 Dec 2020 20:31)  HR: 104 (20 Dec 2020 12:53) (87 - 136)  BP: 102/63 (20 Dec 2020 08:00) (88/58 - 103/70)  BP(mean): 78 (20 Dec 2020 08:00) (67 - 81)  RR: 36 (20 Dec 2020 09:00) (26 - 45)  SpO2: 96% (20 Dec 2020 12:53) (88% - 97%)    Physical Examination:  General: no acute distress, on BiPAP  HEENT: NC/AT, EOMI, anicteric, neck supple  Cardio: S1, S2 heard, RRR, +murmur  Resp: decreased breath sounds bilaterally  Abd: soft, NT, ND, + BS  Neuro: AAOx3, no obvious focal deficits  Ext: mild LE edema, no cyanosis, moving extremities  Skin: warm, dry, no visible rash  Psych: appropriate affect and mood for situation  Lines: PIV    Laboratory Studies:  CBC:                       12.9   10.95 )-----------( 272      ( 20 Dec 2020 09:06 )             37.3     CMP: 12-20    138  |  98  |  24<H>  ----------------------------<  109<H>  3.3<L>   |  32<H>  |  1.00    Ca    8.0<L>      20 Dec 2020 09:06  Phos  2.8     12-20  Mg     2.1     12-20    TPro  6.0  /  Alb  2.2<L>  /  TBili  1.1  /  DBili  x   /  AST  20  /  ALT  27  /  AlkPhos  46  12-20    LIVER FUNCTIONS - ( 20 Dec 2020 09:06 )  Alb: 2.2 g/dL / Pro: 6.0 g/dL / ALK PHOS: 46 U/L / ALT: 27 U/L / AST: 20 U/L / GGT: x           Microbiology:  12/18 - COVID-19 ab - negative  12/18 - COVID-19 PCR - negative  12/14 - blood cultures - negative   12/14 - RVP + COVID-19 by SADIA - negative     Radiology:  CT Angio Chest w/ IV Cont (12.18.20 @ 14:39) > IMPRESSION: No acute pulmonary embolism demonstrated. Bilateral upper lobe groundglass/airspace consolidation with bilateral pleural effusions and underlying compressive atelectasis  Findings may be secondary to pulmonary edema, cannot exclude superimposed infection.  Other findings as discussed above.    Xray Chest 1 View AP/PA (12.14.20 @ 09:34) > IMPRESSION: Nonspecific airspace opacity right lung base. Findings could represent pneumonia in the correct clinical setting. Follow-up recommended. Small right pleural effusion Heart size cannot be accurately assessed in this projection.    Medications:  MEDICATIONS  (STANDING):  atorvastatin 40 milliGRAM(s) Oral at bedtime  chlorhexidine 2% Cloths 1 Application(s) Topical <User Schedule>  diltiazem Infusion 15 mG/Hr (15 mL/Hr) IV Continuous <Continuous>  enoxaparin Injectable 80 milliGRAM(s) SubCutaneous every 12 hours  furosemide   Injectable 40 milliGRAM(s) IV Push two times a day  metoprolol tartrate 25 milliGRAM(s) Oral every 6 hours  piperacillin/tazobactam IVPB.. 3.375 Gram(s) IV Intermittent every 8 hours  potassium chloride    Tablet ER 40 milliEquivalent(s) Oral every 4 hours    Antimicrobials:  piperacillin/tazobactam IVPB.. 3.375 Gram(s) IV Intermittent every 8 hours - started 12/18

## 2020-12-20 NOTE — PROGRESS NOTE ADULT - ATTENDING COMMENTS
64 yo man with MG, htn treated for outpt CAP but with worsening dyspnea and admitted yesterday, found to be in new onset Afib with RVR and acute hypoxemic respiratory failure from pulmonary edema.  Now found to have possible flail MV or ruptured cordae.    --mentating well  --Afib controlled on dilt gtt, continue AC with lovenox  continue lopressor and statin  continue diuresis with lasix   plan for transfer to [place] at family request, accepted by Dr. Keaton Blair  --acute hypoxemic respiratory failure  comfortable on BiPAP 10/6 50% FiO2  --NPO for now until respiratory status improves  --normal renal function  --possible concurrent pneumonia, though doubt this is the primary problem, continue zosyn  --low TSH, check full panel  --stable for transfer to [facility].  Plan discussed with pt, wife Kirsten and brother Dr. Lam  --pt critically ill. CC time 45min

## 2020-12-20 NOTE — PROGRESS NOTE ADULT - SUBJECTIVE AND OBJECTIVE BOX
· Subjective and Objective:   [facility] CARDIOLOGY CONSULTANTS:    Jennyfer Gillis, Romulo Baker, Candelario Gifford, Ana Maria Jackson      [phone]    CHIEF COMPLAINT: Patient is a 65y old  Male who presents with a chief complaint of Aspiration PNA (20 Dec 2020 07:34)    FOLLOW UP:  volume overload, hypoxia, MR    INTERIM HISTORY: Pt still with labored breathing requiring BiPAP, unable to titrate    TELEMETRY: AF    ROS otherwise negative unless noted        PAST MEDICAL & SURGICAL HISTORY:  History of left foot drop    Murmur, cardiac    Myasthenia gravis    HTN (hypertension)    History of back surgery        MEDICATIONS  (STANDING):  atorvastatin 40 milliGRAM(s) Oral at bedtime  chlorhexidine 2% Cloths 1 Application(s) Topical <User Schedule>  diltiazem Infusion 15 mG/Hr (15 mL/Hr) IV Continuous <Continuous>  enoxaparin Injectable 80 milliGRAM(s) SubCutaneous every 12 hours  furosemide   Injectable 40 milliGRAM(s) IV Push two times a day  metoprolol tartrate 25 milliGRAM(s) Oral every 6 hours  piperacillin/tazobactam IVPB.. 3.375 Gram(s) IV Intermittent every 8 hours      Allergies    No Known Allergies    Intolerances                              12.9   10.95 )-----------( 272      ( 20 Dec 2020 09:06 )             37.3       12-20    138  |  98  |  24<H>  ----------------------------<  109<H>  3.3<L>   |  32<H>  |  1.00    Ca    8.0<L>      20 Dec 2020 09:06  Phos  2.8     12-20  Mg     2.1     12-20    TPro  6.0  /  Alb  2.2<L>  /  TBili  1.1  /  DBili  x   /  AST  20  /  ALT  27  /  AlkPhos  46  12-20      LIVER FUNCTIONS - ( 20 Dec 2020 09:06 )  Alb: 2.2 g/dL / Pro: 6.0 g/dL / ALK PHOS: 46 U/L / ALT: 27 U/L / AST: 20 U/L / GGT: x                                   Daily     Daily     I&O's Summary    19 Dec 2020 07:01  -  20 Dec 2020 07:00  --------------------------------------------------------  IN: 705 mL / OUT: 1700 mL / NET: -995 mL        Vital Signs Last 24 Hrs  T(C): 37.1 (20 Dec 2020 04:39), Max: 37.2 (19 Dec 2020 20:31)  T(F): 98.8 (20 Dec 2020 04:39), Max: 99 (19 Dec 2020 20:31)  HR: 108 (20 Dec 2020 09:00) (85 - 136)  BP: 102/63 (20 Dec 2020 08:00) (88/58 - 103/70)  BP(mean): 78 (20 Dec 2020 08:00) (67 - 81)  RR: 36 (20 Dec 2020 09:00) (26 - 45)  SpO2: 95% (20 Dec 2020 09:00) (88% - 97%)    PHYSICAL EXAM:   · Constitutional	Well-developed, well nourished  · Eyes	EOMI; PERRL; no drainage or redness  · ENMT	No oral lesions; no gross abnormalities  · Neck	No bruits; no thyromegaly or nodules  · Respiratory	Normal breath sounds b/l, No RRW  · Cardiovascular	Regular rate & rhythm, normal S1, S2; no murmurs, gallops or rubs; no S3, S4  · Gastrointestinal	Soft, non-tender, no hepatosplenomegaly, normal bowel sounds  · Extremities	No cyanosis, clubbing or edema  · Vascular	Equal and normal pulses (carotid, femoral, dorsalis pedis)  · Neurological	Alert & oriented; no sensory, motor or coordination deficits, normal reflexes

## 2020-12-20 NOTE — PROGRESS NOTE ADULT - ASSESSMENT
65 M history of Myasthenia gravis, HTN, left foot drop presents with worsening shortness of breath. Recently seen in [facility] ED 4 days ago for PNA. Admitted for acute hypoxic respiratory failure 2/2 aspiration PNA. Found to be in new onset Afib w/RVR at 149. Admitted to ICU on 12/18 for persistent afib w/ RVR managed with diltiazem drip.

## 2020-12-20 NOTE — PROGRESS NOTE ADULT - ATTENDING COMMENTS
Av Palomino M.D.  [facility], Division of Infectious Diseases  [phone]  After 5pm on weekdays and all day on weekends - please call [phone]

## 2020-12-20 NOTE — PROGRESS NOTE ADULT - PROBLEM SELECTOR PLAN 2
no hx of afib. EKG here revealed Afib w/ persistent RVR up to 147 bpm, likely 2/2 CAP vs acute decompensated heart failure.  -ICU care  -BPs stable.  -continue cardizem   - continue routine hemodynamic monitoring  - tele monitoring  -ChadsVasc score 2, moderate-high risk.   -Full dose anticoagulation lovenox 80 mg BID  -[facility] consulted.- patient to be transferred to [facility] for mitral valve repair   -On home metoprolol succ 25 mg PO qd. Continue lopressor 25 mg PO BID while inpatient.  -echo with severe MR

## 2020-12-20 NOTE — PROGRESS NOTE ADULT - PROBLEM SELECTOR PLAN 4
EKG showed afib with RVR, likely due to CAP, severe MR and decompensated CHF, has a murmur on exam. Started on cardizem, lasix. CTA chest as above. TTE pending. Cardiology following.

## 2020-12-20 NOTE — PROGRESS NOTE ADULT - PROBLEM SELECTOR PROBLEM 2
Afib
Pneumonia of lower lobe due to infectious organism, unspecified laterality
Afib
Pneumonia of lower lobe due to infectious organism, unspecified laterality
Afib

## 2020-12-20 NOTE — PROGRESS NOTE ADULT - PROBLEM SELECTOR PLAN 1
valv heart disease - HF - diuresis - rate and rhythm control - carido f/u - plan for poss transfer to tertiary care center -   PNA - cont emp ABX - ct chest reviewed - cx and labs and biomarkers  AF - tachyarrhythmia - on Cardizem gtt - remains tachy - optimization in progress  Pleural eff - will benefit from right side thoracentesis - needs to be off Lovenox  Resp Distress - Multifactorial - HF AF PNA Effusion  ICU management and monitoring in progress  Serial CE  serial labs  replete lytes  keep sat > 90 pct.

## 2021-11-02 NOTE — ED ADULT NURSE NOTE - PRIMARY CARE PROVIDER
Subjective: The patient is awake and alert. No problems overnight. Denies chest pain, angina, dyspnea or abdominal discomfort. No nausea or vomiting. Tolerating diet. Wife is at bedside. Denies any further obvious bleeding. Objective:    BP (!) 141/63   Pulse 54   Temp 97.7 °F (36.5 °C) (Oral)   Resp 20   Ht 5' 8\" (1.727 m)   Wt 169 lb (76.7 kg)   SpO2 99%   BMI 25.70 kg/m²     General: NAD  HEENT: +Improving neck, face and upper chest echymoses. +Dried blood on buccal mucosa bilateral.  No thrush or mucositis, EOMI  Heart:  RRR, no murmurs, gallops, or rubs.   Lungs:  CTA bilaterally, no wheeze, rales or rhonchi  Abd: BS present, nontender, nondistended, no masses  Extrem:  No clubbing, cyanosis, or edema  Lymphatics: No palpable adenopathy in cervical and supraclavicular regions  Skin: Intact, no petechia or purpura    CBC with Differential:    Lab Results   Component Value Date    WBC 4.6 11/02/2021    RBC 2.23 11/02/2021    HGB 7.1 11/02/2021    HCT 21.3 11/02/2021     11/02/2021    MCV 95.5 11/02/2021    MCH 31.8 11/02/2021    MCHC 33.3 11/02/2021    RDW 14.6 11/02/2021    METASPCT 1.0 02/07/2019    LYMPHOPCT 10.2 11/02/2021    MONOPCT 9.2 11/02/2021    MYELOPCT 3.0 02/07/2019    BASOPCT 0.0 11/02/2021    MONOSABS 0.42 11/02/2021    LYMPHSABS 0.47 11/02/2021    EOSABS 0.00 11/02/2021    BASOSABS 0.00 11/02/2021     CMP:    Lab Results   Component Value Date     10/28/2021    K 3.9 10/28/2021    K 4.2 10/27/2021     10/28/2021    CO2 18 10/28/2021    BUN 45 10/28/2021    CREATININE 1.2 10/28/2021    GFRAA >60 10/28/2021    LABGLOM 60 10/28/2021    GLUCOSE 85 10/28/2021    PROT 5.5 10/28/2021    LABALBU 2.9 10/28/2021    CALCIUM 8.5 10/28/2021    BILITOT 0.7 10/28/2021    ALKPHOS 71 10/28/2021    AST 92 10/28/2021    ALT 40 10/28/2021      [id number]}     Current Facility-Administered Medications:     white petrolatum ointment, , Topical, BID, Fatou Caal MD, Given at 11/02/21 0946    0.9 % sodium chloride infusion, , IntraVENous, PRN, Greg Denny DO    predniSONE (DELTASONE) tablet 80 mg, 80 mg, Oral, Daily, Mackenzie Latif MD, 80 mg at 11/02/21 0935    sodium chloride flush 0.9 % injection 5-40 mL, 5-40 mL, IntraVENous, 2 times per day, Rafa Gonzales MD, 10 mL at 11/02/21 0935    sodium chloride flush 0.9 % injection 5-40 mL, 5-40 mL, IntraVENous, PRN, Rafa Gonzales MD    0.9 % sodium chloride infusion, 25 mL, IntraVENous, PRN, Rafa Gonzales MD    ondansetron (ZOFRAN-ODT) disintegrating tablet 4 mg, 4 mg, Oral, Q8H PRN **OR** ondansetron (ZOFRAN) injection 4 mg, 4 mg, IntraVENous, Q6H PRN, Rafa Gonzales MD    acetaminophen (TYLENOL) tablet 650 mg, 650 mg, Oral, Q6H PRN, 650 mg at 10/28/21 2041 **OR** acetaminophen (TYLENOL) suppository 650 mg, 650 mg, Rectal, Q6H PRN, Rafa Gonzales MD    HYDROcodone-acetaminophen [facility])  MG per tablet 1 tablet, 1 tablet, Oral, Q6H PRN, Rafa Gonzales MD    lisinopril (PRINIVIL;ZESTRIL) tablet 20 mg, 20 mg, Oral, Daily, 20 mg at 11/02/21 0935 **AND** hydroCHLOROthiazide (HYDRODIURIL) tablet 12.5 mg, 12.5 mg, Oral, Daily, Nubia Abdi MD, 12.5 mg at 11/02/21 0935    amoxicillin-clavulanate (AUGMENTIN) 500-125 MG per tablet 1 tablet, 1 tablet, Oral, 3 times per day, Rafa Gonzales MD, 1 tablet at 11/02/21 1341    pantoprazole (PROTONIX) injection 40 mg, 40 mg, IntraVENous, Daily, Rafa Gonzales MD, 40 mg at 11/02/21 0935    NM GI BLOOD LOSS   Final Result   No evidence of active GI bleeding during acquisition. RECOMMENDATIONS:   If the patient shows hemodynamic signs of an active bleed in the next 20   hours, additional images can be acquired. CT HEAD WO CONTRAST   Final Result   No acute intracranial process is identified. CT ABDOMEN PELVIS WO CONTRAST Additional Contrast? None   Final Result   Nonobstructive stones in the right kidney measuring less than 3 mm.   No obstructive uropathy. Bilateral perinephric stranding, nonspecific  and   which may be chronic. Small partially loculated left pleural effusion. Chronic interstitial and   pleuroparenchymal scarring in the lung bases. Sigmoid diverticulosis with no evidence of diverticulitis. CT head without contrast   Final Result   No acute intracranial abnormality. XR CHEST PORTABLE   Final Result   Atelectasis versus infiltrate at left lung base. Assessment:    Principal Problem:    Hemorrhagic complications of dental implant placement  Active Problems:    Anemia requiring transfusions    BPH (benign prostatic hyperplasia)    Aspiration pneumonia (HCC)  Resolved Problems:    * No resolved hospital problems. *    77 yo M with prior hx of Lyme disease on chronic prednisone 14 mg daily. Had recent dental extraction and implant compicated by current bleeding issues, elevated PTT and anemia secondary to acute blood loss. Also aspiration PNA. Labs revealed mixing study that did not correct. Negative for DIC and LA. Positive AMARI 1:640.  RF WNL at 12. Plan:  Concern for acquired hemophilia, factor 8/9 inhibitor. He was started on prednisone 80 mg daily for inhibitor. Will add Cytoxan once additional labs for titer back. No significant bleeding at this time. Hgb is still trending down, recommend monitoring as inpatient for now. Awaiting remaining coag studies  Transfer to [facility] was initiated last weekend but labs are now returning and he is no longer having severe bleeding. If no further significant can be managed locally. Monitor PTT and CBC w diff daily daily  Discussed status extensively with patient and wife.           Electronically signed by Maribel Redd MD on 11/2/2021 at 5:08 PM Brian

## 2022-06-25 NOTE — DIETITIAN INITIAL EVALUATION ADULT. - DIET TYPE
Emergency Department Encounter     Evaluation Date & Time:   6/25/2022  2:24 PM    CHIEF COMPLAINT:  Generalized Weakness and Fever      Triage Note:          Pt with metastatic bowel cancer is here due to weakness, dizziness, sob with activity and fevers of 101 for the last 4 days.  Pts bp is 78/46 in triage and hr 160. Notes state that pt wants to go on hospice.          Impression and Plan       FINAL IMPRESSION:    ICD-10-CM    1. Anemia, unspecified type  D64.9    2. Thrombocytopenia (H)  D69.6    3. Lymphoepithelioma (H)  C80.1     metastatic   4. Lactic acidosis  E87.2    5. Other specified hypotension  I95.89    6. Sinus tachycardia  R00.0      ED COURSE & MEDICAL DECISION MAKING:  3:03 PM I met the patient and performed my initial interview and exam.   3:13 PM Called blood bank  3:20 PM Spoke with patient and obtained consent for blood transfusion.   4:07 PM Spoke with lab regarding critical results. Hemoglobin 3.7 and lactic acid 4.1   5:22 PM I rechecked and updated the patient   6:03 PM Spoke with Dr. 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 No chest pain. He has had similar symptoms recently due to low hemoglobin. He endorses 3-4 daily episodes of bloody stools and diarrhea since Tuesday (~4 days ago). Decreased po intake, but denies nausea / vomiting.    He also notes intermittent fevers (TMax to 101.8); no fevers in the past 2 days and he has not taken any recent antipyretics. No cough, abdominal pain, urinary symptoms.    The patient's last chemotherapy was 3 weeks ago. The patient recently had a blood transfusion last week Monday.     On presentation, patient hypotensive (70s  systolic) and tachycardic (160s). He appears very pale, but is mentating well.     Patient placed on cardiac monitor, his port was accessed but does not draw blood and IV access established and blood sent for labs.    I suspect severe anemia and thus he was given only a small IV fluid bolus as to not dilute his already presumed low hemoglobin.    His hemoglobin returned extremely low at 3.7 for which he was given 2 units PRBCs after obtaining written informed consent.    He does have a lactic acidosis (4.1). Blood cultures obtained and he was given broad-spectrum antibiotics (IV Vancomycin and IV Zosyn), however he has no focal infectious symptoms and no source of infection identified. I suspect elevated lactate is more likely secondary to shock from profound anemia. Repeat lactate after 2 units PRBCs improved to 2.1. His BP and pulse have also improved after transfusion (BP 90s systolic and HR 120s). He will likely need additional transfusion in the hospital. I have ordered a repeat hemoglobin to assess progress and it returned at 6.1.     I considered CTA chest to rule out PE as well as CTA abdomen / pelvis (negative ~ 10 days ago for Hb 6.7) to evaluate for any active bleeding, however after speaking with on-call oncologist, their recommendation was to hold at this time as they have been discussing hospice care. I did have a discussion with the patient regarding hospice and his decision is unclear at this time; family conference will likely need to occur while he is in the hospital.    Patient admitted for further blood transfusion and management. Patient in improved condition at time of disposition.       At the conclusion of the encounter I discussed the results of all the tests and the disposition. The questions were answered. The patient and family acknowledged understanding and were agreeable with the care plan.    30 minutes of critical care time    MEDICATIONS GIVEN IN THE EMERGENCY  DEPARTMENT:  Medications   sodium chloride (PF) 0.9% PF flush 3 mL (has no administration in time range)   sodium chloride (PF) 0.9% PF flush 3 mL (10 mLs Intravenous Given 6/25/22 2208)   lidocaine 1 % 0.1-1 mL (has no administration in time range)   lidocaine (LMX4) cream (has no administration in time range)   sodium chloride (PF) 0.9% PF flush 3 mL (has no administration in time range)   sodium chloride (PF) 0.9% PF flush 3 mL (has no administration in time range)   melatonin tablet 1 mg (has no administration in time range)   HYDROmorphone (DILAUDID) injection 0.2 mg (has no administration in time range)   ondansetron (ZOFRAN ODT) ODT tab 4 mg (has no administration in time range)     Or   ondansetron (ZOFRAN) injection 4 mg (has no administration in time range)   naloxone (NARCAN) injection 0.2 mg (has no administration in time range)     Or   naloxone (NARCAN) injection 0.4 mg (has no administration in time range)     Or   naloxone (NARCAN) injection 0.2 mg (has no administration in time range)     Or   naloxone (NARCAN) injection 0.4 mg (has no administration in time range)   piperacillin-tazobactam (ZOSYN) 3.375 g vial to attach to  mL bag (3.375 g Intravenous Given 6/25/22 1623)   vancomycin (VANCOCIN) 1,500 mg in sodium chloride 0.9 % 250 mL intermittent infusion (0 mg Intravenous Stopped 6/25/22 2000)   0.9% sodium chloride BOLUS (0 mLs Intravenous Stopped 6/25/22 2012)   gabapentin (NEURONTIN) capsule 600 mg (600 mg Oral Given 6/25/22 2111)   diphenoxylate-atropine (LOMOTIL) 2.5-0.025 MG per tablet 1 tablet (1 tablet Oral Given 6/25/22 2125)   pantoprazole (PROTONIX) EC tablet 40 mg (40 mg Oral Given 6/25/22 2208)       NEW PRESCRIPTIONS STARTED AT TODAY'S ED VISIT:  Current Discharge Medication List          HPI     HPI     Victoriano Schmidt is a 31 year old male, 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    The patient reports generalized weakness for the past couple of days associated with lightheadedness and exertional shortness of breath. He denies chest pain. He states that symptoms are consistent with low hemoglobin. He endorses 3-4 daily episodes of bloody stools and diarrhea since Tuesday (~4 days ago). His mother notes that he has been eating less than usual; denies nausea / vomiting.     He also notes fevers (TMax to 101.8); no fevers in the past 2 days and he has not taken any recent antipyretics. No cough, abdominal pain, urinary symptoms.    The patient's last chemotherapy was 3 weeks ago. The patient recently had a blood transfusion last week Monday.     REVIEW OF SYSTEMS:  All other systems reviewed and are negative.      Medical History     Past Medical History:   Diagnosis Date     Anemia, unspecified type      Benign essential hypertension      Cancer (H)      Cervical radiculopathy      Constipation      Lymphoma (H)      Shortness of breath      Spine metastasis (H)        Past Surgical History:   Procedure Laterality Date     CT BIOPSY BONE  5/27/2020     ESOPHAGOSCOPY, GASTROSCOPY, DUODENOSCOPY (EGD), COMBINED N/A 6/14/2022    Procedure: ESOPHAGOGASTRODUODENOSCOPY (EGD) with epinephrine injection and gastric biopsies;  Surgeon: Greyson Martinez MD;  Location: [facility]     IR CHEST PORT PLACEMENT > 5 YRS OF AGE  9/10/2019     IR PORT PLACEMENT >5 YEARS  9/10/2019     US BIOPSY FINE NEEDLE ASPIRATION LYMPH NODE  7/29/2019      HEAD NECK THORAX SOFT TISSUE BIOPSY  5/1/2020       Family History   Problem Relation Age of Onset     No Known Problems Mother      No Known Problems Father      No Known Problems Sister      No Known Problems Brother      No Known Problems Son      No Known Problems Sister      No Known Problems Brother      No Known Problems Brother        Social History     Tobacco Use     Smoking status: Current Every Day Smoker     Packs/day: 0.75      "Years: 13.00     Pack years: 9.75     Types: Cigarettes     Smokeless tobacco: Never Used     Tobacco comment: \"no more than 5\" daily.   Substance Use Topics     Alcohol use: Not Currently     Drug use: Never       No current outpatient medications on file.      Physical Exam     First Vitals:  Patient Vitals for the past 24 hrs:   BP Temp Temp src Pulse Resp SpO2 Height Weight   06/25/22 2217 -- -- -- -- -- -- 1.676 m (5' 6\") 79.9 kg (176 lb 3.2 oz)   06/25/22 2203 97/56 99.4  F (37.4  C) Oral (!) 128 16 92 % -- --   06/25/22 2120 -- -- -- (!) 126 (!) 36 98 % -- --   06/25/22 2115 98/57 -- -- (!) 126 (!) 38 99 % -- --   06/25/22 2105 -- -- -- (!) 122 (!) 32 100 % -- --   06/25/22 2100 92/53 -- -- (!) 123 28 100 % -- --   06/25/22 2055 -- -- -- (!) 122 28 100 % -- --   06/25/22 2050 -- -- -- (!) 122 (!) 31 100 % -- --   06/25/22 2045 92/54 -- -- (!) 121 -- 100 % -- --   06/25/22 2030 91/54 -- -- (!) 122 -- 100 % -- --   06/25/22 2015 93/54 -- -- (!) 127 (!) 34 100 % -- --   06/25/22 2010 -- -- -- (!) 124 29 100 % -- --   06/25/22 2000 94/52 -- -- (!) 121 -- 100 % -- --   06/25/22 1950 90/51 -- -- 119 (!) 31 100 % -- --   06/25/22 1945 90/53 -- -- 119 (!) 31 100 % -- --   06/25/22 1940 93/53 -- -- 119 (!) 34 100 % -- --   06/25/22 1935 (!) 88/50 -- -- 119 (!) 34 100 % -- --   06/25/22 1930 (!) 86/50 -- -- 120 (!) 32 100 % -- --   06/25/22 1925 91/53 -- -- (!) 121 -- 100 % -- --   06/25/22 1915 (!) 87/50 -- -- (!) 122 -- 100 % -- --   06/25/22 1900 90/54 -- -- (!) 121 28 100 % -- --   06/25/22 1854 (!) 89/54 99.5  F (37.5  C) Axillary (!) 123 30 -- -- --   06/25/22 1845 (!) 84/53 -- -- (!) 126 -- 100 % -- --   06/25/22 1840 (!) 85/51 -- -- (!) 128 (!) 34 100 % -- --   06/25/22 1839 (!) 81/48 99.3  F (37.4  C) -- (!) 128 26 -- -- --   06/25/22 1830 (!) 86/54 -- -- (!) 122 30 100 % -- --   06/25/22 1825 (!) 85/53 -- -- (!) 122 30 100 % -- --   06/25/22 1820 (!) 87/53 -- -- (!) 122 24 100 % -- --   06/25/22 1815 (!) " "80/51 -- -- (!) 122 (!) 31 100 % -- --   06/25/22 1805 (!) 84/50 -- -- (!) 124 (!) 32 100 % -- --   06/25/22 1800 (!) 89/50 -- -- (!) 124 25 100 % -- --   06/25/22 1735 (!) 79/45 -- -- (!) 129 29 100 % -- --   06/25/22 1734 (!) (P) 79/45 -- -- -- -- -- -- --   06/25/22 1730 (!) 81/50 -- -- (!) 132 29 100 % -- --   06/25/22 1725 (!) 80/46 -- -- (!) 130 (!) 31 100 % -- --   06/25/22 1720 (!) 78/47 -- -- (!) 131 (!) 31 100 % -- --   06/25/22 1717 (!) 79/45 -- -- (!) 132 (!) 32 100 % -- --   06/25/22 1715 (!) 79/45 -- -- (!) 132 27 100 % -- --   06/25/22 1713 (!) 81/48 99.3  F (37.4  C) Oral (!) 132 28 -- -- --   06/25/22 1645 (!) 81/49 -- -- (!) 132 30 100 % -- --   06/25/22 1630 (!) 80/48 -- -- (!) 132 -- 100 % -- --   06/25/22 1615 (!) 86/54 -- -- (!) 137 -- 100 % -- --   06/25/22 1600 (!) 81/52 -- -- (!) 135 -- 100 % -- --   06/25/22 1545 (!) 81/51 -- -- (!) 137 -- 100 % -- --   06/25/22 1530 (!) 81/51 -- -- (!) 140 -- 100 % -- --   06/25/22 1515 (!) 77/47 -- -- (!) 138 -- 100 % -- --   06/25/22 1500 (!) 78/48 -- -- (!) 140 -- 100 % -- --   06/25/22 1420 (!) 78/46 98.8  F (37.1  C) Tympanic (!) 166 12 93 % 1.676 m (5' 6\") 74.8 kg (165 lb)       PHYSICAL EXAM:   Physical Exam    GENERAL: Awake, alert.  In no acute distress. Appears very pale.  HEENT: Normocephalic, atraumatic. Pupils equal, round and reactive. Conjunctiva normal.   NECK: No stridor.  PULMONARY: Symmetrical breath sounds without distress.  Lungs clear to auscultation bilaterally without wheezes, rhonchi or rales.  CARDIO: Tachycardic rate with regular rhythm.  No significant murmur, rub or gallop.    ABDOMINAL: Abdomen soft, mildly distended (? ascites) and non-tender to palpation.    EXTREMITIES: Mild non-pitting BL lower extremity edema.      NEURO: Alert and oriented to person, place and time.  Cranial nerves grossly intact.  No focal motor deficit.  PSYCH: Normal mood and affect.  SKIN: No rashes. Appears very pale.     Results     LAB:  All " pertinent labs reviewed and interpreted  Labs Ordered and Resulted from Time of ED Arrival to Time of ED Departure   LACTIC ACID WHOLE BLOOD - Abnormal       Result Value    Lactic Acid 4.1 (*)    CBC WITH PLATELETS AND DIFFERENTIAL - Abnormal    WBC Count 4.4      RBC Count 1.27 (*)     Hemoglobin 3.7 (*)     Hematocrit 11.8 (*)     MCV 93      MCH 29.1      MCHC 31.4 (*)     RDW 15.0      Platelet Count 37 (*)     % Neutrophils 85      % Lymphocytes 4      % Monocytes 8      % Eosinophils 0      % Basophils 0      % Immature Granulocytes 3      NRBCs per 100 WBC 1 (*)     Absolute Neutrophils 3.9      Absolute Lymphocytes 0.2 (*)     Absolute Monocytes 0.4      Absolute Eosinophils 0.0      Absolute Basophils 0.0      Absolute Immature Granulocytes 0.1      Absolute NRBCs 0.0     BASIC METABOLIC PANEL - Abnormal    Sodium 134 (*)     Potassium 3.9      Chloride 100      Carbon Dioxide (CO2) 20 (*)     Anion Gap 14      Urea Nitrogen 41 (*)     Creatinine 1.25      Calcium 7.5 (*)     Glucose 111      GFR Estimate 78     HEPATIC FUNCTION PANEL - Abnormal    Bilirubin Total 0.7      Bilirubin Direct 0.4      Protein Total 5.2 (*)     Albumin 1.7 (*)     Alkaline Phosphatase 147 (*)     AST 28      ALT <9     INR - Abnormal    INR 1.48 (*)    PARTIAL THROMBOPLASTIN TIME - Abnormal    aPTT 41 (*)    ROUTINE UA WITH MICROSCOPIC REFLEX TO CULTURE - Abnormal    Color Urine Light Yellow      Appearance Urine Clear      Glucose Urine Negative      Bilirubin Urine Negative      Ketones Urine Trace (*)     Specific Gravity Urine 1.020      Blood Urine Negative      pH Urine 5.0      Protein Albumin Urine 10  (*)     Urobilinogen Urine <2.0      Nitrite Urine Negative      Leukocyte Esterase Urine Negative      Bacteria Urine Few (*)     Mucus Urine Present (*)     Uric Acid Crystals Urine Moderate (*)     RBC Urine 2      WBC Urine 3      Hyaline Casts Urine 3 (*)    LACTIC ACID WHOLE BLOOD - Abnormal    Lactic Acid 2.1 (*)     COVID-19 VIRUS (CORONAVIRUS) BY PCR - Normal    SARS CoV2 PCR Negative     TYPE AND SCREEN, ADULT    ABO/RH(D) B POS      Antibody Screen Negative      SPECIMEN EXPIRATION DATE [id number]     PREPARE RED BLOOD CELLS (UNIT)    CROSSMATCH Compatible      UNIT ABO/RH B Pos      Unit Number [id number]      Unit Status Issued      Blood Component Type Red Blood Cells      Product Code F3607Z26      CODING SYSTEM [id number]      UNIT TYPE ISBT 7300      ISSUE DATE AND TIME [id number]     PREPARE RED BLOOD CELLS (UNIT)    CROSSMATCH Compatible      UNIT ABO/RH B Pos      Unit Number [id number]      Unit Status Issued      Blood Component Type Red Blood Cells      Product Code [id number]      CODING SYSTEM [id number]      UNIT TYPE ISBT 7300      ISSUE DATE AND TIME [id number]     PREPARE RED BLOOD CELLS (UNIT)   BLOOD CULTURE   BLOOD CULTURE   URINE CULTURE   TRANSFUSE RED BLOOD CELLS (UNIT)   TRANSFUSE RED BLOOD CELLS (UNIT)   ABO/RH TYPE AND SCREEN       RADIOLOGY:  XR Chest Port 1 View   Final Result   IMPRESSION: Port-A-Cath tip in the SVC. Shallow inspiration. Some minimal fibrosis left lung base. Chest otherwise negative. No acute new findings.        PROCEDURES:  Procedures:      Critical Care     Performed by: Leonela Pham MD  Authorized by: Leonela Pham MD  Total critical care time: 30 minutes    Critical care was necessary to treat or prevent imminent or life-threatening deterioration of the following conditions: symptomatic, severe anemia    Critical care was time spent personally by me on the following activities: development of treatment plan with patient or surrogate, discussions with consultants, examination of patient, evaluation of patient's response to treatment, obtaining history from patient or surrogate, ordering and performing treatments and interventions, ordering and review of laboratory studies, ordering and review of radiographic studies, re-evaluation of patient's condition and  monitoring for potential decompensation.  Critical care time was exclusive of separately billable procedures and treating other patients.      I, Zoila Nazarioluis felipe, am serving as a scribe to document services personally performed by Leonela Pham MD based on my observation and the provider's statements to me. I, Leonela Pham MD attest that Zoila Decker is acting in a scribe capacity, has observed my performance of the services and has documented them in accordance with my direction.    Leonela Pham MD  Emergency Medicine  [facility] EMERGENCY DEPARTMENT         Leonela Pham MD  06/26/22 2863     low sodium

## 2023-02-09 PROBLEM — Z87.39 PERSONAL HISTORY OF OTHER DISEASES OF THE MUSCULOSKELETAL SYSTEM AND CONNECTIVE TISSUE: Chronic | Status: ACTIVE | Noted: 2020-12-18

## 2023-02-15 ENCOUNTER — APPOINTMENT (OUTPATIENT)
Dept: ORTHOPEDIC SURGERY | Facility: CLINIC | Age: 68
End: 2023-02-15
Payer: MEDICARE

## 2023-02-15 VITALS — HEIGHT: 73 IN | WEIGHT: 175 LBS | BODY MASS INDEX: 23.19 KG/M2

## 2023-02-15 DIAGNOSIS — Z78.9 OTHER SPECIFIED HEALTH STATUS: ICD-10-CM

## 2023-02-15 DIAGNOSIS — Z86.79 PERSONAL HISTORY OF OTHER DISEASES OF THE CIRCULATORY SYSTEM: ICD-10-CM

## 2023-02-15 DIAGNOSIS — N20.0 CALCULUS OF KIDNEY: ICD-10-CM

## 2023-02-15 DIAGNOSIS — Z86.39 PERSONAL HISTORY OF OTHER ENDOCRINE, NUTRITIONAL AND METABOLIC DISEASE: ICD-10-CM

## 2023-02-15 DIAGNOSIS — S76.012A STRAIN OF MUSCLE, FASCIA AND TENDON OF LEFT HIP, INITIAL ENCOUNTER: ICD-10-CM

## 2023-02-15 PROCEDURE — 99203 OFFICE O/P NEW LOW 30 MIN: CPT

## 2023-02-15 PROCEDURE — 73502 X-RAY EXAM HIP UNI 2-3 VIEWS: CPT

## 2023-02-15 RX ORDER — ATORVASTATIN CALCIUM 80 MG/1
TABLET, FILM COATED ORAL
Refills: 0 | Status: ACTIVE | COMMUNITY

## 2023-02-15 RX ORDER — METOPROLOL TARTRATE 75 MG/1
TABLET, FILM COATED ORAL
Refills: 0 | Status: ACTIVE | COMMUNITY

## 2023-02-15 RX ORDER — HYDROCHLOROTHIAZIDE 12.5 MG/1
TABLET ORAL
Refills: 0 | Status: ACTIVE | COMMUNITY

## 2023-02-15 RX ORDER — NAPROXEN 500 MG/1
500 TABLET ORAL
Qty: 40 | Refills: 1 | Status: ACTIVE | COMMUNITY
Start: 2023-02-15 | End: 1900-01-01

## 2023-02-15 RX ORDER — PREDNISONE 5 MG/1
5 TABLET ORAL
Refills: 0 | Status: ACTIVE | COMMUNITY

## 2023-02-15 NOTE — DISCUSSION/SUMMARY
[de-identified] : Options were discussed. He is about 75% better. He will take the EC Naprosyn 500 and f/u in 1 mos.

## 2023-02-15 NOTE — HISTORY OF PRESENT ILLNESS
[de-identified] : Patient is here today for the left hip.  Patient was swinging golf club a week ago and noted ap op and since has pain over the groin/ASIS lateral hip.  He states since he noted its about 3/4 's better. he denies prior hx.  He notes pain with certain motions.

## 2023-02-15 NOTE — PHYSICAL EXAM
[Left] : left hip with pelvis [de-identified] : Constitutional: The patient appears well developed, well nourished. Examination of patients ability to communicate functionally was normal.  \par \par  \par \par Neurologic: Coordination is normal. Alert and oriented to time, place and person. No evidence of mood disorder, calm affect.  \par \par  \par \par   LEFT    HIP/THIGH: Inspection of the hip/thigh is as follows: Inspection shows no swelling, no ecchymosis, no erythema, no rashes, no masses and no enlarged lymph nodes.  \par \par  \par \par Palpation of the hip/thigh is as follows: greater trochanter tenderness. no groin tenderness.  \par \par  \par \par Range of motion of the hip is as follows in degrees:   \par \par  \par \par Flexion: 100  \par \par Abduction:  20  \par \par External rotation:  40  \par \par Internal rotation:  30   \par \par  \par \par Strength testing of the hip/thigh is as follows:  \par \par  \par \par Hip flexion strength:  5/5,  \par \par Hip extension strength:  5/5  \par \par Hip abduction strength:   5/5   \par \par Hip adduction strength:   5/5.  \par \par  \par \par Special tests of the hip/thigh is as follows: pain in bursa with internal rotation and pain bursa with external rotation.  \par \par  \par \par Neurological testing of the hip/thigh is as follows: motor exam 5/5 throughout, light touch intact throughout and no focal motor defecits.  \par \par  \par \par Gait and function is as follows: non-antalgic gait. \par \par   [FreeTextEntry9] : ap/lat show mild hip DJD Right greater than Left

## 2023-05-01 ENCOUNTER — APPOINTMENT (OUTPATIENT)
Dept: ORTHOPEDIC SURGERY | Facility: CLINIC | Age: 68
End: 2023-05-01
Payer: MEDICARE

## 2023-05-01 ENCOUNTER — FORM ENCOUNTER (OUTPATIENT)
Age: 68
End: 2023-05-01

## 2023-05-01 VITALS — HEIGHT: 73 IN | WEIGHT: 175 LBS | BODY MASS INDEX: 23.19 KG/M2

## 2023-05-01 PROCEDURE — 20610 DRAIN/INJ JOINT/BURSA W/O US: CPT | Mod: LT

## 2023-05-01 PROCEDURE — 99213 OFFICE O/P EST LOW 20 MIN: CPT | Mod: 25

## 2023-05-01 NOTE — REASON FOR VISIT
Caring for Your Gastrostomy tube (G-Tube)    Discharge Instructions: Caring for Your Gastrostomy Tube (G-Tube)  · You have been discharged with a gastrostomy tube, or G-tube. The G-tube was inserted through your abdominal wall and into your stomach to provide you with food, fluids, and medication. Your G-tube may move in and out slightly. If the tube comes out all the way, don’t put it back in. Call your doctor immediately (not the next morning) as the G-tube tract through the skin closes very quickly, often within 24 hours.    During Recovery  · You can expect to have a collection bag attached to the tube over night after the procedure.  tomorrow, you may clamp the tube and remove the bag if there is no bleeding or excessive output.  Your nurse will show you how to clamp the tube and remove the bag.  · After 6-8 hours, you may take small sips of clear liquids if you are able to take fluids by mouth.  · After 6-8 hours and once the tube is clamped and the bag is removed, you may take your oral medications. NOTE: if you need to take your medication before that time (ie. Pain pills), please consult the doctor who performed your procedure.    General Guidelines for Use  · Wash your hands thoroughly before touching your G-tube.  · Clean the area around the tube with mild soap and water.  · Pat the area dry during bathing and as needed.  · Clean the area more often if it becomes wet or weepy.   · Change dressing once a day starting day 2.  Then change dressing daily or as needed to keep site dry.  · Keep the flange a few millimeters off the skin (just enough room to accommodate a gauze sponge). Pulling the flange too tightly can damage the skin, but leaving the flange too loose leads to leaking around the G-tube. The medical team will cover these guidelines prior to discharge  · Please remember to flush the tube at least daily with 30cc water to prevent clogging.  Also flush your tube with at least 30cc water before and  after use.  · Please perform daily dressing/gauze  Changes until site is completely dry.  Once site is completely dry you may leave it open to air.  · You may shower starting 2 days after your procedure. Keep dressing covered with water proof cover (such as plastic wrap). After shower remove old dressing, make sure site is dry and place new dry dressing.    Follow-Up  ?  Call to schedule for a T-fastener removal in 7-10 days after insertion date in the radiology department.    When to Call Your Doctor  Call your doctor right away if you have any of the following:  · A tube that is dislodged from the stomach, call immediately  · Vomiting  · Fever above 101.5°F (38.5°C)  · Diarrhea that lasts more than 2 days  · Signs of infection (redness, swelling, or warmth at the tube site)  · Drainage from the tube site    We can be reached at the following number:  [phone] Monday - Friday 7am- 5pm; [phone] Monday- Friday non-emergent issues; [phone] after 5pm Monday -Friday/ weekends/ holidays    Moderate Sedation    The procedure you've been scheduled for is more comfortable when you receive sedation. The medication given to sedate and make you comfortable is given intravenously (IV) before the procedure begins. Your nurse or doctor will start an IV after you've changed into a gown and your vital signs (blood pressure, pulse, respiration and temperature) have been recorded. Our staff will answer any questions you have, and then position you comfortably before any sedative medicines are given. Once given, these medicines slowly (within 30 seconds to one minute) begin to make you feel relaxed. Your vital signs will be watched closely during the sedation and procedure, and you may be given oxygen while sleeping.    Note: This is not general anesthesia - which would make you unconscious, possibly requiring insertion of a breathing (endotracheal or ET) tube. Moderate sedation does not have the same potential for  general anesthetic side effects, like nausea.    After your procedure, your vital signs will be monitored for at least 30 minutes. Some patients may have a \"lightheaded or groggy\" feeling for a short time (30 minutes to a few hours), but you should be ready for discharge in about one hour after waking up. You will need to arrange for someone to drive you home.    Special instructions for moderate sedation    Diet and medications  · If your procedure is scheduled before noon, you should not eat anything after midnight. You may take your usual medications with a sip of water, unless otherwise instructed by your doctor and/or nurse.  · If your procedure is scheduled at noon or after, you may take clear liquids (water, gelatin, tea, apple juice) 6 hours prior to the procedure and your usual medications unless otherwise instructed by your doctor and/or nurse.  · Diabetics - generally the above would apply, but check with you doctor and/or nurse for any special instructions regarding your diet and/or medications.  · Check with the doctor regarding any blood thinning medicines, including aspirin and ibuprofen.    After Care  · Make arrangements for a ride home at discharge.  · Moderate sedation patients should not drive, operate heavy or potentially harmful equipment, make important legal decisions, or drink alcohol for 24 hours.  · Quiet, restful activity is recommended when you go home.  · Walk with help as long as you feel lightheaded or groggy.   · Resume normal diet and medicines.    If you have questions, the Radiology Nurses can be reached at Radiology Nursing Department ([phone] - after hours, [phone] 7am-5pm, [phone] non-emergent voicemail  . For after hours questions or concerns, please call your doctor.   [FreeTextEntry2] : continued pain left hip past 2 1/2 months.

## 2023-05-01 NOTE — HISTORY OF PRESENT ILLNESS
[3] : 3 [0] : 0 [Dull/Aching] : dull/aching [Intermittent] : intermittent [Ice] : ice [Walking] : walking [de-identified] : 05/01/23:  Return visit for a 67 year old male now 2 1/2 months s/p injury to lt hip swinging a golf club, continues to c/o lateral lt hip pain with stairclimbing, walking uphill. NO pain walking on flat ground. Can lie on lt side at night. Can cross his legs. taking no nsaids. No limp.\par \par PMH: NO prior hip issues before 2/23.\par \par 2/15/23 Patient is here today for the left hip.  Patient was swinging golf club a week ago and noted ap op and since has pain over the groin/ASIS lateral hip.  He states since he noted its about 3/4 's better. he denies prior hx.  He notes pain with certain motions.  [] : no [FreeTextEntry1] : left hip [FreeTextEntry9] : advil [de-identified] : 3/15/23 [de-identified] : Dr Banks [de-identified] : none

## 2023-05-01 NOTE — DISCUSSION/SUMMARY
[de-identified] : "Written by Chayito Ro, acting as Scribe for Massimo Farr MD."\par \par Dr. Farr - \par The documentation recorded by the scribe accurately reflects the service I personally performed and the decisions made by me.

## 2023-05-01 NOTE — PHYSICAL EXAM
[Normal Mood and Affect] : normal mood and affect [Able to Communicate] : able to communicate [Well Developed] : well developed [Well Nourished] : well nourished [Left] : left hip [5___] : adduction 5[unfilled]/5 [NL (35)] : adduction 35 degrees [NL (45)] : internal rotation 45 degrees [de-identified] : \par \par   [] : non-antalgic [FreeTextEntry9] : Lateral pain with passive adduction.  [TWNoteComboBox7] : flexion 100 degrees [de-identified] : abduction 20 degrees [de-identified] : external rotation 40 degrees [TWNoteComboBox6] : internal rotation 30 degrees

## 2023-05-01 NOTE — PROCEDURE
[Large Joint Injection] : Large joint injection [Left] : of the left [Greater Trochanteric Bursa] : greater trochanteric bursa [Pain] : pain [Inflammation] : inflammation [Betadine] : betadine [Ethyl Chloride sprayed topically] : ethyl chloride sprayed topically [___ cc    1%] : Lidocaine ~Vcc of 1%  [___ cc    40mg] : Methylprednisolone (Depomedrol) ~Vcc of 40 mg  [] : Patient tolerated procedure well [Call if redness, pain or fever occur] : call if redness, pain or fever occur [Apply ice for 15min out of every hour for the next 12-24 hours as tolerated] : apply ice for 15 minutes out of every hour for the next 12-24 hours as tolerated [Risks, benefits, alternatives discussed / Verbal consent obtained] : the risks benefits, and alternatives have been discussed, and verbal consent was obtained

## 2023-05-02 ENCOUNTER — APPOINTMENT (OUTPATIENT)
Dept: MRI IMAGING | Facility: CLINIC | Age: 68
End: 2023-05-02
Payer: MEDICARE

## 2023-05-02 ENCOUNTER — TRANSCRIPTION ENCOUNTER (OUTPATIENT)
Age: 68
End: 2023-05-02

## 2023-05-02 PROCEDURE — 73721 MRI JNT OF LWR EXTRE W/O DYE: CPT | Mod: LT,MH

## 2023-05-15 ENCOUNTER — APPOINTMENT (OUTPATIENT)
Dept: ORTHOPEDIC SURGERY | Facility: CLINIC | Age: 68
End: 2023-05-15
Payer: MEDICARE

## 2023-05-15 VITALS — WEIGHT: 175 LBS | HEIGHT: 61 IN | BODY MASS INDEX: 33.04 KG/M2

## 2023-05-15 DIAGNOSIS — M70.62 TROCHANTERIC BURSITIS, LEFT HIP: ICD-10-CM

## 2023-05-15 DIAGNOSIS — M24.159 OTHER ARTICULAR CARTILAGE DISORDERS, UNSPECIFIED HIP: ICD-10-CM

## 2023-05-15 DIAGNOSIS — M16.12 UNILATERAL PRIMARY OSTEOARTHRITIS, LEFT HIP: ICD-10-CM

## 2023-05-15 PROCEDURE — 99213 OFFICE O/P EST LOW 20 MIN: CPT

## 2023-05-15 NOTE — HISTORY OF PRESENT ILLNESS
[de-identified] : 05/15/23:  Here today for left hip MRI results.  Had good relief from cortisone injection, but still feels weakness in the area.  Is scheduled to start PT in over a week.\par \par Impression:\par 1. 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 Joint effusion with no fracture.\par 2. Gluteal tendinopathy with partial tearing at the greater trochanter with soft tissue edema, muscular edema, and minimal bursitis.\par 3. 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 NO pain walking on flat ground. Can lie on lt side at night. Can cross his legs. taking no nsaids. No limp.\par \par PMH: NO prior hip issues before 2/23.\par \par 2/15/23 Patient is here today for the left hip.  Patient was swinging golf club a week ago and noted ap op and since has pain over the groin/ASIS lateral hip.  He states since he noted its about 3/4 's better. he denies prior hx.  He notes pain with certain motions.

## 2023-05-15 NOTE — DISCUSSION/SUMMARY
[de-identified] : "Written by Chayito Ro, acting as Scribe for Massimo Farr MD."\par \par Dr. Farr - \par The documentation recorded by the scribe accurately reflects the service I personally performed and the decisions made by me.

## 2023-05-15 NOTE — PHYSICAL EXAM
[Normal Mood and Affect] : normal mood and affect [Able to Communicate] : able to communicate [Well Developed] : well developed [Well Nourished] : well nourished [Left] : left hip [NL (35)] : adduction 35 degrees [NL (45)] : internal rotation 45 degrees [5___] : adduction 5[unfilled]/5 [de-identified] : \par \par   [] : non-antalgic [FreeTextEntry9] : Lateral pain with passive adduction.  [TWNoteComboBox7] : flexion 100 degrees

## 2023-06-19 ENCOUNTER — APPOINTMENT (OUTPATIENT)
Dept: ORTHOPEDIC SURGERY | Facility: CLINIC | Age: 68
End: 2023-06-19

## 2024-06-07 NOTE — SWALLOW BEDSIDE ASSESSMENT ADULT - ORAL PREPARATORY PHASE
Within functional limits What Type Of Note Output Would You Prefer (Optional)?: Standard Output How Severe Is Your Skin Lesion?: mild Has Your Skin Lesion Been Treated?: not been treated Is This A New Presentation, Or A Follow-Up?: Skin Lesion